# Patient Record
Sex: MALE | Race: WHITE | Employment: PART TIME | URBAN - METROPOLITAN AREA
[De-identification: names, ages, dates, MRNs, and addresses within clinical notes are randomized per-mention and may not be internally consistent; named-entity substitution may affect disease eponyms.]

---

## 2017-06-02 ENCOUNTER — TELEPHONE (OUTPATIENT)
Dept: FAMILY MEDICINE CLINIC | Age: 67
End: 2017-06-02

## 2017-06-02 DIAGNOSIS — E78.2 MIXED HYPERLIPIDEMIA: ICD-10-CM

## 2017-06-02 RX ORDER — ATORVASTATIN CALCIUM 40 MG/1
TABLET, FILM COATED ORAL
Qty: 90 TABLET | Refills: 3 | Status: SHIPPED | OUTPATIENT
Start: 2017-06-02 | End: 2017-06-23 | Stop reason: SDUPTHER

## 2017-06-02 RX ORDER — ATORVASTATIN CALCIUM 40 MG/1
TABLET, FILM COATED ORAL
Qty: 90 TABLET | Refills: 3 | Status: SHIPPED | OUTPATIENT
Start: 2017-06-02 | End: 2017-06-02 | Stop reason: SDUPTHER

## 2017-06-13 DIAGNOSIS — I10 ESSENTIAL HYPERTENSION: Primary | ICD-10-CM

## 2017-06-13 DIAGNOSIS — E78.2 MIXED HYPERLIPIDEMIA: ICD-10-CM

## 2017-06-13 DIAGNOSIS — Z12.5 PROSTATE CANCER SCREENING: ICD-10-CM

## 2017-06-16 DIAGNOSIS — I10 ESSENTIAL HYPERTENSION: ICD-10-CM

## 2017-06-16 DIAGNOSIS — E78.2 MIXED HYPERLIPIDEMIA: ICD-10-CM

## 2017-06-16 DIAGNOSIS — Z12.5 PROSTATE CANCER SCREENING: ICD-10-CM

## 2017-06-16 LAB
ALBUMIN SERPL-MCNC: 4.4 G/DL (ref 3.9–4.9)
ALP BLD-CCNC: 81 U/L (ref 35–104)
ALT SERPL-CCNC: 32 U/L (ref 0–41)
ANION GAP SERPL CALCULATED.3IONS-SCNC: 12 MEQ/L (ref 7–13)
AST SERPL-CCNC: 30 U/L (ref 0–40)
BASOPHILS ABSOLUTE: 0.1 K/UL (ref 0–0.2)
BASOPHILS RELATIVE PERCENT: 2.6 %
BILIRUB SERPL-MCNC: 1 MG/DL (ref 0–1.2)
BUN BLDV-MCNC: 12 MG/DL (ref 8–23)
CALCIUM SERPL-MCNC: 8.9 MG/DL (ref 8.6–10.2)
CHLORIDE BLD-SCNC: 105 MEQ/L (ref 98–107)
CHOLESTEROL, TOTAL: 216 MG/DL (ref 0–199)
CO2: 24 MEQ/L (ref 22–29)
CREAT SERPL-MCNC: 1.05 MG/DL (ref 0.7–1.2)
EOSINOPHILS ABSOLUTE: 0.2 K/UL (ref 0–0.7)
EOSINOPHILS RELATIVE PERCENT: 3.2 %
GFR AFRICAN AMERICAN: >60
GFR NON-AFRICAN AMERICAN: >60
GLOBULIN: 2.2 G/DL (ref 2.3–3.5)
GLUCOSE BLD-MCNC: 93 MG/DL (ref 74–109)
HCT VFR BLD CALC: 45.6 % (ref 42–52)
HDLC SERPL-MCNC: 68 MG/DL (ref 40–59)
HEMOGLOBIN: 15.2 G/DL (ref 14–18)
LDL CHOLESTEROL CALCULATED: 119 MG/DL (ref 0–129)
LYMPHOCYTES ABSOLUTE: 0.9 K/UL (ref 1–4.8)
LYMPHOCYTES RELATIVE PERCENT: 16.8 %
MCH RBC QN AUTO: 29.4 PG (ref 27–31.3)
MCHC RBC AUTO-ENTMCNC: 33.3 % (ref 33–37)
MCV RBC AUTO: 88.5 FL (ref 80–100)
MONOCYTES ABSOLUTE: 0.6 K/UL (ref 0.2–0.8)
MONOCYTES RELATIVE PERCENT: 10.8 %
NEUTROPHILS ABSOLUTE: 3.5 K/UL (ref 1.4–6.5)
NEUTROPHILS RELATIVE PERCENT: 66.6 %
PDW BLD-RTO: 13.6 % (ref 11.5–14.5)
PLATELET # BLD: 299 K/UL (ref 130–400)
POTASSIUM SERPL-SCNC: 4.3 MEQ/L (ref 3.5–5.1)
PROSTATE SPECIFIC ANTIGEN: 0.96 NG/ML (ref 0–5.4)
RBC # BLD: 5.15 M/UL (ref 4.7–6.1)
SLIDE REVIEW: ABNORMAL
SODIUM BLD-SCNC: 141 MEQ/L (ref 132–144)
TOTAL PROTEIN: 6.6 G/DL (ref 6.4–8.1)
TRIGL SERPL-MCNC: 147 MG/DL (ref 0–200)
TSH SERPL DL<=0.05 MIU/L-ACNC: 7.26 UIU/ML (ref 0.27–4.2)
WBC # BLD: 5.2 K/UL (ref 4.8–10.8)

## 2017-06-19 DIAGNOSIS — I10 ESSENTIAL HYPERTENSION: ICD-10-CM

## 2017-06-19 DIAGNOSIS — K21.9 GASTROESOPHAGEAL REFLUX DISEASE, ESOPHAGITIS PRESENCE NOT SPECIFIED: ICD-10-CM

## 2017-06-19 RX ORDER — PINDOLOL 10 MG/1
TABLET ORAL
Qty: 90 TABLET | Refills: 0 | Status: SHIPPED | OUTPATIENT
Start: 2017-06-19 | End: 2017-06-23 | Stop reason: SDUPTHER

## 2017-06-19 RX ORDER — PANTOPRAZOLE SODIUM 40 MG/1
TABLET, DELAYED RELEASE ORAL
Qty: 90 TABLET | Refills: 0 | Status: SHIPPED | OUTPATIENT
Start: 2017-06-19 | End: 2017-06-23 | Stop reason: SDUPTHER

## 2017-06-23 ENCOUNTER — OFFICE VISIT (OUTPATIENT)
Dept: FAMILY MEDICINE CLINIC | Age: 67
End: 2017-06-23

## 2017-06-23 VITALS
DIASTOLIC BLOOD PRESSURE: 86 MMHG | OXYGEN SATURATION: 98 % | HEART RATE: 68 BPM | WEIGHT: 213 LBS | RESPIRATION RATE: 12 BRPM | TEMPERATURE: 96.9 F | BODY MASS INDEX: 30.49 KG/M2 | SYSTOLIC BLOOD PRESSURE: 136 MMHG | HEIGHT: 70 IN

## 2017-06-23 DIAGNOSIS — K21.9 GASTROESOPHAGEAL REFLUX DISEASE, ESOPHAGITIS PRESENCE NOT SPECIFIED: ICD-10-CM

## 2017-06-23 DIAGNOSIS — Z00.00 WELL ADULT EXAM: Primary | ICD-10-CM

## 2017-06-23 DIAGNOSIS — M15.9 PRIMARY OSTEOARTHRITIS INVOLVING MULTIPLE JOINTS: ICD-10-CM

## 2017-06-23 DIAGNOSIS — E03.4 HYPOTHYROIDISM DUE TO ACQUIRED ATROPHY OF THYROID: ICD-10-CM

## 2017-06-23 DIAGNOSIS — I10 ESSENTIAL HYPERTENSION: ICD-10-CM

## 2017-06-23 DIAGNOSIS — E78.2 MIXED HYPERLIPIDEMIA: ICD-10-CM

## 2017-06-23 DIAGNOSIS — Z12.11 COLON CANCER SCREENING: ICD-10-CM

## 2017-06-23 PROCEDURE — 99397 PER PM REEVAL EST PAT 65+ YR: CPT | Performed by: FAMILY MEDICINE

## 2017-06-23 RX ORDER — LEVOTHYROXINE SODIUM 0.03 MG/1
25 TABLET ORAL DAILY
Qty: 30 TABLET | Refills: 3 | Status: SHIPPED | OUTPATIENT
Start: 2017-06-23 | End: 2017-09-29 | Stop reason: SDUPTHER

## 2017-06-23 RX ORDER — PANTOPRAZOLE SODIUM 40 MG/1
TABLET, DELAYED RELEASE ORAL
Qty: 90 TABLET | Refills: 3 | Status: SHIPPED | OUTPATIENT
Start: 2017-06-23 | End: 2018-07-01 | Stop reason: SDUPTHER

## 2017-06-23 RX ORDER — PINDOLOL 10 MG/1
TABLET ORAL
Qty: 90 TABLET | Refills: 3 | Status: SHIPPED | OUTPATIENT
Start: 2017-06-23 | End: 2018-07-01 | Stop reason: SDUPTHER

## 2017-06-23 RX ORDER — ATORVASTATIN CALCIUM 40 MG/1
TABLET, FILM COATED ORAL
Qty: 90 TABLET | Refills: 3 | Status: SHIPPED | OUTPATIENT
Start: 2017-06-23 | End: 2018-06-18 | Stop reason: SDUPTHER

## 2017-06-23 ASSESSMENT — PATIENT HEALTH QUESTIONNAIRE - PHQ9
1. LITTLE INTEREST OR PLEASURE IN DOING THINGS: 0
SUM OF ALL RESPONSES TO PHQ9 QUESTIONS 1 & 2: 0
SUM OF ALL RESPONSES TO PHQ QUESTIONS 1-9: 0
2. FEELING DOWN, DEPRESSED OR HOPELESS: 0

## 2017-06-23 ASSESSMENT — ENCOUNTER SYMPTOMS
EYES NEGATIVE: 1
RESPIRATORY NEGATIVE: 1
ORTHOPNEA: 0
GASTROINTESTINAL NEGATIVE: 1
SHORTNESS OF BREATH: 0
BLURRED VISION: 0
ALLERGIC/IMMUNOLOGIC NEGATIVE: 1

## 2017-08-21 ENCOUNTER — PATIENT MESSAGE (OUTPATIENT)
Dept: FAMILY MEDICINE CLINIC | Age: 67
End: 2017-08-21

## 2017-08-23 ENCOUNTER — PATIENT MESSAGE (OUTPATIENT)
Dept: FAMILY MEDICINE CLINIC | Age: 67
End: 2017-08-23

## 2017-09-23 DIAGNOSIS — E03.4 HYPOTHYROIDISM DUE TO ACQUIRED ATROPHY OF THYROID: ICD-10-CM

## 2017-09-23 DIAGNOSIS — E78.2 MIXED HYPERLIPIDEMIA: ICD-10-CM

## 2017-09-23 DIAGNOSIS — I10 ESSENTIAL HYPERTENSION: ICD-10-CM

## 2017-09-23 DIAGNOSIS — K21.9 GASTROESOPHAGEAL REFLUX DISEASE, ESOPHAGITIS PRESENCE NOT SPECIFIED: ICD-10-CM

## 2017-09-23 LAB
ALBUMIN SERPL-MCNC: 4.2 G/DL (ref 3.9–4.9)
ALP BLD-CCNC: 94 U/L (ref 35–104)
ALT SERPL-CCNC: 39 U/L (ref 0–41)
ANION GAP SERPL CALCULATED.3IONS-SCNC: 19 MEQ/L (ref 7–13)
AST SERPL-CCNC: 37 U/L (ref 0–40)
BILIRUB SERPL-MCNC: 0.9 MG/DL (ref 0–1.2)
BUN BLDV-MCNC: 15 MG/DL (ref 8–23)
CALCIUM SERPL-MCNC: 9.1 MG/DL (ref 8.6–10.2)
CHLORIDE BLD-SCNC: 105 MEQ/L (ref 98–107)
CHOLESTEROL, TOTAL: 217 MG/DL (ref 0–199)
CO2: 20 MEQ/L (ref 22–29)
CREAT SERPL-MCNC: 0.95 MG/DL (ref 0.7–1.2)
FOLATE: 9.2 NG/ML (ref 7.3–26.1)
GFR AFRICAN AMERICAN: >60
GFR NON-AFRICAN AMERICAN: >60
GLOBULIN: 2.5 G/DL (ref 2.3–3.5)
GLUCOSE BLD-MCNC: 94 MG/DL (ref 74–109)
HDLC SERPL-MCNC: 69 MG/DL (ref 40–59)
LDL CHOLESTEROL CALCULATED: 114 MG/DL (ref 0–129)
MAGNESIUM: 2.3 MG/DL (ref 1.7–2.3)
POTASSIUM SERPL-SCNC: 4.5 MEQ/L (ref 3.5–5.1)
SODIUM BLD-SCNC: 144 MEQ/L (ref 132–144)
T4 FREE: 1.04 NG/DL (ref 0.93–1.7)
TOTAL PROTEIN: 6.7 G/DL (ref 6.4–8.1)
TRIGL SERPL-MCNC: 170 MG/DL (ref 0–200)
VITAMIN B-12: 611 PG/ML (ref 211–946)

## 2017-09-27 LAB
VITAMIN D2 AND D3, TOTAL: 47 NG/ML (ref 30–80)
VITAMIN D2, 25 HYDROXY: <1 NG/ML
VITAMIN D3,25 HYDROXY: 47 NG/ML

## 2017-09-29 ENCOUNTER — OFFICE VISIT (OUTPATIENT)
Dept: FAMILY MEDICINE CLINIC | Age: 67
End: 2017-09-29

## 2017-09-29 VITALS
RESPIRATION RATE: 16 BRPM | OXYGEN SATURATION: 98 % | TEMPERATURE: 97.1 F | DIASTOLIC BLOOD PRESSURE: 78 MMHG | HEIGHT: 70 IN | WEIGHT: 210 LBS | BODY MASS INDEX: 30.06 KG/M2 | SYSTOLIC BLOOD PRESSURE: 130 MMHG | HEART RATE: 78 BPM

## 2017-09-29 DIAGNOSIS — I10 ESSENTIAL HYPERTENSION: Primary | ICD-10-CM

## 2017-09-29 DIAGNOSIS — E78.2 MIXED HYPERLIPIDEMIA: ICD-10-CM

## 2017-09-29 DIAGNOSIS — Z11.59 NEED FOR HEPATITIS C SCREENING TEST: ICD-10-CM

## 2017-09-29 DIAGNOSIS — E04.1 THYROID NODULE: ICD-10-CM

## 2017-09-29 DIAGNOSIS — E03.4 HYPOTHYROIDISM DUE TO ACQUIRED ATROPHY OF THYROID: ICD-10-CM

## 2017-09-29 DIAGNOSIS — Z12.11 SCREENING FOR COLON CANCER: ICD-10-CM

## 2017-09-29 PROCEDURE — 99214 OFFICE O/P EST MOD 30 MIN: CPT | Performed by: FAMILY MEDICINE

## 2017-09-29 PROCEDURE — G8427 DOCREV CUR MEDS BY ELIG CLIN: HCPCS | Performed by: FAMILY MEDICINE

## 2017-09-29 PROCEDURE — 4040F PNEUMOC VAC/ADMIN/RCVD: CPT | Performed by: FAMILY MEDICINE

## 2017-09-29 PROCEDURE — G8417 CALC BMI ABV UP PARAM F/U: HCPCS | Performed by: FAMILY MEDICINE

## 2017-09-29 PROCEDURE — 1036F TOBACCO NON-USER: CPT | Performed by: FAMILY MEDICINE

## 2017-09-29 PROCEDURE — 1123F ACP DISCUSS/DSCN MKR DOCD: CPT | Performed by: FAMILY MEDICINE

## 2017-09-29 PROCEDURE — 3017F COLORECTAL CA SCREEN DOC REV: CPT | Performed by: FAMILY MEDICINE

## 2017-09-29 RX ORDER — POLYETHYLENE GLYCOL 3350, SODIUM CHLORIDE, SODIUM BICARBONATE, POTASSIUM CHLORIDE 420; 11.2; 5.72; 1.48 G/4L; G/4L; G/4L; G/4L
4000 POWDER, FOR SOLUTION ORAL ONCE
Qty: 4000 ML | Refills: 0 | Status: SHIPPED | OUTPATIENT
Start: 2017-09-29 | End: 2017-09-29

## 2017-09-29 RX ORDER — LEVOTHYROXINE SODIUM 0.05 MG/1
50 TABLET ORAL DAILY
Qty: 30 TABLET | Refills: 5 | Status: SHIPPED | OUTPATIENT
Start: 2017-09-29 | End: 2017-10-30 | Stop reason: SDUPTHER

## 2017-09-29 ASSESSMENT — ENCOUNTER SYMPTOMS
GASTROINTESTINAL NEGATIVE: 1
ALLERGIC/IMMUNOLOGIC NEGATIVE: 1
RESPIRATORY NEGATIVE: 1
EYES NEGATIVE: 1

## 2017-10-13 ENCOUNTER — HOSPITAL ENCOUNTER (OUTPATIENT)
Dept: ULTRASOUND IMAGING | Age: 67
Discharge: HOME | End: 2017-10-13
Payer: COMMERCIAL

## 2017-10-13 DIAGNOSIS — E04.1 THYROID NODULE: ICD-10-CM

## 2017-10-13 DIAGNOSIS — E03.4 HYPOTHYROIDISM DUE TO ACQUIRED ATROPHY OF THYROID: ICD-10-CM

## 2017-10-13 PROCEDURE — 76536 US EXAM OF HEAD AND NECK: CPT

## 2017-10-30 DIAGNOSIS — E03.4 HYPOTHYROIDISM DUE TO ACQUIRED ATROPHY OF THYROID: ICD-10-CM

## 2017-10-30 RX ORDER — LEVOTHYROXINE SODIUM 0.05 MG/1
50 TABLET ORAL DAILY
Qty: 30 TABLET | Refills: 5 | Status: SHIPPED | OUTPATIENT
Start: 2017-10-30 | End: 2018-03-12

## 2018-03-09 DIAGNOSIS — E03.4 HYPOTHYROIDISM DUE TO ACQUIRED ATROPHY OF THYROID: ICD-10-CM

## 2018-03-12 RX ORDER — LEVOTHYROXINE SODIUM 0.05 MG/1
TABLET ORAL
Qty: 30 TABLET | Refills: 5 | Status: SHIPPED | OUTPATIENT
Start: 2018-03-12 | End: 2018-07-13 | Stop reason: SDUPTHER

## 2018-03-30 DIAGNOSIS — E03.4 HYPOTHYROIDISM DUE TO ACQUIRED ATROPHY OF THYROID: ICD-10-CM

## 2018-03-30 DIAGNOSIS — E78.2 MIXED HYPERLIPIDEMIA: ICD-10-CM

## 2018-03-30 DIAGNOSIS — I10 ESSENTIAL HYPERTENSION: ICD-10-CM

## 2018-03-30 DIAGNOSIS — Z11.59 NEED FOR HEPATITIS C SCREENING TEST: ICD-10-CM

## 2018-03-30 LAB
ALBUMIN SERPL-MCNC: 4.1 G/DL (ref 3.9–4.9)
ALP BLD-CCNC: 89 U/L (ref 35–104)
ALT SERPL-CCNC: 39 U/L (ref 0–41)
ANION GAP SERPL CALCULATED.3IONS-SCNC: 16 MEQ/L (ref 7–13)
AST SERPL-CCNC: 33 U/L (ref 0–40)
BASOPHILS ABSOLUTE: 0.1 K/UL (ref 0–0.2)
BASOPHILS RELATIVE PERCENT: 1.8 %
BILIRUB SERPL-MCNC: 0.6 MG/DL (ref 0–1.2)
BUN BLDV-MCNC: 12 MG/DL (ref 8–23)
CALCIUM SERPL-MCNC: 9 MG/DL (ref 8.6–10.2)
CHLORIDE BLD-SCNC: 101 MEQ/L (ref 98–107)
CHOLESTEROL, TOTAL: 195 MG/DL (ref 0–199)
CO2: 24 MEQ/L (ref 22–29)
CREAT SERPL-MCNC: 0.87 MG/DL (ref 0.7–1.2)
EOSINOPHILS ABSOLUTE: 0.2 K/UL (ref 0–0.7)
EOSINOPHILS RELATIVE PERCENT: 4.4 %
GFR AFRICAN AMERICAN: >60
GFR NON-AFRICAN AMERICAN: >60
GLOBULIN: 2.3 G/DL (ref 2.3–3.5)
GLUCOSE BLD-MCNC: 96 MG/DL (ref 74–109)
HCT VFR BLD CALC: 42 % (ref 42–52)
HDLC SERPL-MCNC: 60 MG/DL (ref 40–59)
HEMOGLOBIN: 14.6 G/DL (ref 14–18)
HEPATITIS C ANTIBODY INTERPRETATION: NORMAL
LDL CHOLESTEROL CALCULATED: 102 MG/DL (ref 0–129)
LYMPHOCYTES ABSOLUTE: 0.8 K/UL (ref 1–4.8)
LYMPHOCYTES RELATIVE PERCENT: 17.8 %
MCH RBC QN AUTO: 30.7 PG (ref 27–31.3)
MCHC RBC AUTO-ENTMCNC: 34.7 % (ref 33–37)
MCV RBC AUTO: 88.4 FL (ref 80–100)
MONOCYTES ABSOLUTE: 0.4 K/UL (ref 0.2–0.8)
MONOCYTES RELATIVE PERCENT: 10 %
NEUTROPHILS ABSOLUTE: 2.9 K/UL (ref 1.4–6.5)
NEUTROPHILS RELATIVE PERCENT: 66 %
PDW BLD-RTO: 13.8 % (ref 11.5–14.5)
PLATELET # BLD: 307 K/UL (ref 130–400)
POTASSIUM SERPL-SCNC: 4.1 MEQ/L (ref 3.5–5.1)
RBC # BLD: 4.75 M/UL (ref 4.7–6.1)
SODIUM BLD-SCNC: 141 MEQ/L (ref 132–144)
T4 FREE: 1.16 NG/DL (ref 0.93–1.7)
TOTAL PROTEIN: 6.4 G/DL (ref 6.4–8.1)
TRIGL SERPL-MCNC: 165 MG/DL (ref 0–200)
TSH REFLEX: 2.05 UIU/ML (ref 0.27–4.2)
WBC # BLD: 4.5 K/UL (ref 4.8–10.8)

## 2018-04-06 ENCOUNTER — OFFICE VISIT (OUTPATIENT)
Dept: FAMILY MEDICINE CLINIC | Age: 68
End: 2018-04-06
Payer: COMMERCIAL

## 2018-04-06 VITALS
HEIGHT: 70 IN | TEMPERATURE: 97.4 F | DIASTOLIC BLOOD PRESSURE: 80 MMHG | HEART RATE: 75 BPM | OXYGEN SATURATION: 98 % | WEIGHT: 219 LBS | RESPIRATION RATE: 16 BRPM | SYSTOLIC BLOOD PRESSURE: 122 MMHG | BODY MASS INDEX: 31.35 KG/M2

## 2018-04-06 DIAGNOSIS — E78.2 MIXED HYPERLIPIDEMIA: ICD-10-CM

## 2018-04-06 DIAGNOSIS — I10 ESSENTIAL HYPERTENSION: Primary | ICD-10-CM

## 2018-04-06 DIAGNOSIS — E03.4 HYPOTHYROIDISM DUE TO ACQUIRED ATROPHY OF THYROID: ICD-10-CM

## 2018-04-06 DIAGNOSIS — Z12.5 SPECIAL SCREENING EXAMINATION FOR NEOPLASM OF PROSTATE: ICD-10-CM

## 2018-04-06 DIAGNOSIS — Z12.11 SCREENING FOR COLON CANCER: ICD-10-CM

## 2018-04-06 DIAGNOSIS — E04.1 THYROID NODULE: ICD-10-CM

## 2018-04-06 PROCEDURE — 99214 OFFICE O/P EST MOD 30 MIN: CPT | Performed by: FAMILY MEDICINE

## 2018-04-06 PROCEDURE — G8427 DOCREV CUR MEDS BY ELIG CLIN: HCPCS | Performed by: FAMILY MEDICINE

## 2018-04-06 PROCEDURE — 4040F PNEUMOC VAC/ADMIN/RCVD: CPT | Performed by: FAMILY MEDICINE

## 2018-04-06 PROCEDURE — 3017F COLORECTAL CA SCREEN DOC REV: CPT | Performed by: FAMILY MEDICINE

## 2018-04-06 PROCEDURE — 1036F TOBACCO NON-USER: CPT | Performed by: FAMILY MEDICINE

## 2018-04-06 PROCEDURE — 1123F ACP DISCUSS/DSCN MKR DOCD: CPT | Performed by: FAMILY MEDICINE

## 2018-04-06 PROCEDURE — G8417 CALC BMI ABV UP PARAM F/U: HCPCS | Performed by: FAMILY MEDICINE

## 2018-04-06 RX ORDER — AMLODIPINE BESYLATE 5 MG/1
5 TABLET ORAL DAILY
COMMUNITY
End: 2018-12-24 | Stop reason: SDUPTHER

## 2018-04-06 ASSESSMENT — ENCOUNTER SYMPTOMS
GASTROINTESTINAL NEGATIVE: 1
EYES NEGATIVE: 1
ALLERGIC/IMMUNOLOGIC NEGATIVE: 1
RESPIRATORY NEGATIVE: 1

## 2018-05-06 PROBLEM — Z12.5 SPECIAL SCREENING EXAMINATION FOR NEOPLASM OF PROSTATE: Status: RESOLVED | Noted: 2018-04-06 | Resolved: 2018-05-06

## 2018-05-17 ENCOUNTER — OFFICE VISIT (OUTPATIENT)
Dept: GASTROENTEROLOGY | Age: 68
End: 2018-05-17

## 2018-05-17 VITALS
DIASTOLIC BLOOD PRESSURE: 84 MMHG | WEIGHT: 212 LBS | BODY MASS INDEX: 30.86 KG/M2 | SYSTOLIC BLOOD PRESSURE: 133 MMHG | HEART RATE: 68 BPM

## 2018-05-17 DIAGNOSIS — Z12.11 SPECIAL SCREENING FOR MALIGNANT NEOPLASMS, COLON: Primary | ICD-10-CM

## 2018-05-17 PROCEDURE — PREOPEXAM PRE-OP EXAM: Performed by: INTERNAL MEDICINE

## 2018-06-15 ENCOUNTER — ANESTHESIA (OUTPATIENT)
Dept: ENDOSCOPY | Age: 68
End: 2018-06-15
Payer: MEDICARE

## 2018-06-15 ENCOUNTER — HOSPITAL ENCOUNTER (OUTPATIENT)
Age: 68
Setting detail: OUTPATIENT SURGERY
Discharge: HOME | End: 2018-06-15
Attending: INTERNAL MEDICINE | Admitting: INTERNAL MEDICINE
Payer: MEDICARE

## 2018-06-15 ENCOUNTER — ANESTHESIA EVENT (OUTPATIENT)
Dept: ENDOSCOPY | Age: 68
End: 2018-06-15
Payer: MEDICARE

## 2018-06-15 VITALS
SYSTOLIC BLOOD PRESSURE: 128 MMHG | RESPIRATION RATE: 12 BRPM | OXYGEN SATURATION: 98 % | DIASTOLIC BLOOD PRESSURE: 79 MMHG

## 2018-06-15 VITALS
DIASTOLIC BLOOD PRESSURE: 74 MMHG | BODY MASS INDEX: 29.63 KG/M2 | RESPIRATION RATE: 18 BRPM | WEIGHT: 207 LBS | OXYGEN SATURATION: 98 % | HEART RATE: 74 BPM | SYSTOLIC BLOOD PRESSURE: 139 MMHG | HEIGHT: 70 IN | TEMPERATURE: 98.3 F

## 2018-06-15 PROCEDURE — 7100000010 HC PHASE II RECOVERY - FIRST 15 MIN: Performed by: INTERNAL MEDICINE

## 2018-06-15 PROCEDURE — 45385 COLONOSCOPY W/LESION REMOVAL: CPT | Performed by: INTERNAL MEDICINE

## 2018-06-15 PROCEDURE — 2580000003 HC RX 258: Performed by: NURSE ANESTHETIST, CERTIFIED REGISTERED

## 2018-06-15 PROCEDURE — 6360000002 HC RX W HCPCS: Performed by: NURSE ANESTHETIST, CERTIFIED REGISTERED

## 2018-06-15 PROCEDURE — 3609027000 HC COLONOSCOPY: Performed by: INTERNAL MEDICINE

## 2018-06-15 PROCEDURE — 3700000000 HC ANESTHESIA ATTENDED CARE: Performed by: INTERNAL MEDICINE

## 2018-06-15 PROCEDURE — 3700000001 HC ADD 15 MINUTES (ANESTHESIA): Performed by: INTERNAL MEDICINE

## 2018-06-15 PROCEDURE — 2500000003 HC RX 250 WO HCPCS: Performed by: NURSE ANESTHETIST, CERTIFIED REGISTERED

## 2018-06-15 RX ORDER — SODIUM CHLORIDE 0.9 % (FLUSH) 0.9 %
SYRINGE (ML) INJECTION PRN
Status: DISCONTINUED | OUTPATIENT
Start: 2018-06-15 | End: 2018-06-15 | Stop reason: SDUPTHER

## 2018-06-15 RX ORDER — SODIUM CHLORIDE 9 MG/ML
INJECTION, SOLUTION INTRAVENOUS CONTINUOUS
Status: DISCONTINUED | OUTPATIENT
Start: 2018-06-15 | End: 2018-06-15 | Stop reason: HOSPADM

## 2018-06-15 RX ORDER — PROPOFOL 10 MG/ML
INJECTION, EMULSION INTRAVENOUS PRN
Status: DISCONTINUED | OUTPATIENT
Start: 2018-06-15 | End: 2018-06-15 | Stop reason: SDUPTHER

## 2018-06-15 RX ORDER — LIDOCAINE HYDROCHLORIDE 20 MG/ML
INJECTION, SOLUTION INFILTRATION; PERINEURAL PRN
Status: DISCONTINUED | OUTPATIENT
Start: 2018-06-15 | End: 2018-06-15 | Stop reason: SDUPTHER

## 2018-06-15 RX ORDER — ONDANSETRON 2 MG/ML
4 INJECTION INTRAMUSCULAR; INTRAVENOUS
Status: DISCONTINUED | OUTPATIENT
Start: 2018-06-15 | End: 2018-06-15 | Stop reason: HOSPADM

## 2018-06-15 RX ADMIN — Medication 5 ML: at 08:45

## 2018-06-15 RX ADMIN — PROPOFOL 30 MG: 10 INJECTION, EMULSION INTRAVENOUS at 08:53

## 2018-06-15 RX ADMIN — PROPOFOL 40 MG: 10 INJECTION, EMULSION INTRAVENOUS at 08:47

## 2018-06-15 RX ADMIN — Medication 10 ML: at 08:57

## 2018-06-15 RX ADMIN — PROPOFOL 30 MG: 10 INJECTION, EMULSION INTRAVENOUS at 08:51

## 2018-06-15 RX ADMIN — PROPOFOL 40 MG: 10 INJECTION, EMULSION INTRAVENOUS at 08:45

## 2018-06-15 RX ADMIN — PROPOFOL 30 MG: 10 INJECTION, EMULSION INTRAVENOUS at 08:55

## 2018-06-15 RX ADMIN — LIDOCAINE HYDROCHLORIDE 40 MG: 20 INJECTION, SOLUTION INFILTRATION; PERINEURAL at 08:45

## 2018-06-15 RX ADMIN — PROPOFOL 40 MG: 10 INJECTION, EMULSION INTRAVENOUS at 08:49

## 2018-06-15 ASSESSMENT — PAIN - FUNCTIONAL ASSESSMENT: PAIN_FUNCTIONAL_ASSESSMENT: 0-10

## 2018-06-18 DIAGNOSIS — E78.2 MIXED HYPERLIPIDEMIA: ICD-10-CM

## 2018-06-19 RX ORDER — ATORVASTATIN CALCIUM 40 MG/1
TABLET, FILM COATED ORAL
Qty: 90 TABLET | Refills: 3 | Status: SHIPPED | OUTPATIENT
Start: 2018-06-19 | End: 2018-07-13 | Stop reason: SDUPTHER

## 2018-06-21 RX ORDER — AZITHROMYCIN 250 MG/1
TABLET, FILM COATED ORAL
Qty: 1 PACKET | Refills: 0 | OUTPATIENT
Start: 2018-06-21 | End: 2018-06-25

## 2018-06-22 ENCOUNTER — OFFICE VISIT (OUTPATIENT)
Dept: FAMILY MEDICINE CLINIC | Age: 68
End: 2018-06-22
Payer: MEDICARE

## 2018-06-22 VITALS
BODY MASS INDEX: 30.69 KG/M2 | SYSTOLIC BLOOD PRESSURE: 130 MMHG | HEIGHT: 70 IN | WEIGHT: 214.4 LBS | HEART RATE: 75 BPM | OXYGEN SATURATION: 97 % | DIASTOLIC BLOOD PRESSURE: 74 MMHG | RESPIRATION RATE: 14 BRPM | TEMPERATURE: 97.6 F

## 2018-06-22 DIAGNOSIS — L23.7 CONTACT DERMATITIS DUE TO POISON IVY: Primary | ICD-10-CM

## 2018-06-22 PROCEDURE — 4040F PNEUMOC VAC/ADMIN/RCVD: CPT | Performed by: NURSE PRACTITIONER

## 2018-06-22 PROCEDURE — 99213 OFFICE O/P EST LOW 20 MIN: CPT | Performed by: NURSE PRACTITIONER

## 2018-06-22 PROCEDURE — 1123F ACP DISCUSS/DSCN MKR DOCD: CPT | Performed by: NURSE PRACTITIONER

## 2018-06-22 PROCEDURE — 3017F COLORECTAL CA SCREEN DOC REV: CPT | Performed by: NURSE PRACTITIONER

## 2018-06-22 PROCEDURE — G8417 CALC BMI ABV UP PARAM F/U: HCPCS | Performed by: NURSE PRACTITIONER

## 2018-06-22 PROCEDURE — G8427 DOCREV CUR MEDS BY ELIG CLIN: HCPCS | Performed by: NURSE PRACTITIONER

## 2018-06-22 PROCEDURE — 1036F TOBACCO NON-USER: CPT | Performed by: NURSE PRACTITIONER

## 2018-06-22 RX ORDER — METHYLPREDNISOLONE 4 MG/1
TABLET ORAL
Qty: 1 KIT | Refills: 0 | Status: SHIPPED | OUTPATIENT
Start: 2018-06-22 | End: 2019-01-16 | Stop reason: ALTCHOICE

## 2018-06-22 ASSESSMENT — ENCOUNTER SYMPTOMS
DIARRHEA: 0
EYE PAIN: 0
VOMITING: 0
COUGH: 0
RHINORRHEA: 0
SHORTNESS OF BREATH: 0
NAIL CHANGES: 0
SORE THROAT: 0

## 2018-07-01 DIAGNOSIS — K21.9 GASTROESOPHAGEAL REFLUX DISEASE, ESOPHAGITIS PRESENCE NOT SPECIFIED: ICD-10-CM

## 2018-07-01 DIAGNOSIS — I10 ESSENTIAL HYPERTENSION: ICD-10-CM

## 2018-07-02 RX ORDER — PINDOLOL 10 MG/1
TABLET ORAL
Qty: 90 TABLET | Refills: 3 | Status: SHIPPED | OUTPATIENT
Start: 2018-07-02 | End: 2018-07-13 | Stop reason: SDUPTHER

## 2018-07-02 RX ORDER — PANTOPRAZOLE SODIUM 40 MG/1
TABLET, DELAYED RELEASE ORAL
Qty: 90 TABLET | Refills: 3 | Status: SHIPPED | OUTPATIENT
Start: 2018-07-02 | End: 2018-07-13 | Stop reason: SDUPTHER

## 2018-07-13 DIAGNOSIS — K21.9 GASTROESOPHAGEAL REFLUX DISEASE, ESOPHAGITIS PRESENCE NOT SPECIFIED: ICD-10-CM

## 2018-07-13 DIAGNOSIS — E03.4 HYPOTHYROIDISM DUE TO ACQUIRED ATROPHY OF THYROID: ICD-10-CM

## 2018-07-13 DIAGNOSIS — E78.2 MIXED HYPERLIPIDEMIA: ICD-10-CM

## 2018-07-13 DIAGNOSIS — I10 ESSENTIAL HYPERTENSION: ICD-10-CM

## 2018-07-13 RX ORDER — PANTOPRAZOLE SODIUM 40 MG/1
TABLET, DELAYED RELEASE ORAL
Qty: 90 TABLET | Refills: 3 | Status: SHIPPED | OUTPATIENT
Start: 2018-07-13 | End: 2019-01-16 | Stop reason: SDUPTHER

## 2018-07-13 RX ORDER — PINDOLOL 10 MG/1
TABLET ORAL
Qty: 90 TABLET | Refills: 3 | Status: SHIPPED | OUTPATIENT
Start: 2018-07-13 | End: 2019-01-16 | Stop reason: SDUPTHER

## 2018-07-13 RX ORDER — ATORVASTATIN CALCIUM 40 MG/1
TABLET, FILM COATED ORAL
Qty: 90 TABLET | Refills: 3 | Status: SHIPPED | OUTPATIENT
Start: 2018-07-13 | End: 2019-01-09 | Stop reason: SDUPTHER

## 2018-07-13 RX ORDER — LEVOTHYROXINE SODIUM 0.05 MG/1
TABLET ORAL
Qty: 30 TABLET | Refills: 5 | Status: SHIPPED | OUTPATIENT
Start: 2018-07-13 | End: 2019-01-09 | Stop reason: SDUPTHER

## 2018-10-15 ENCOUNTER — HOSPITAL ENCOUNTER (OUTPATIENT)
Dept: ULTRASOUND IMAGING | Age: 68
Discharge: HOME | End: 2018-10-17
Payer: MEDICARE

## 2018-10-15 DIAGNOSIS — E04.1 THYROID NODULE: ICD-10-CM

## 2018-10-15 PROCEDURE — 76536 US EXAM OF HEAD AND NECK: CPT

## 2018-12-24 RX ORDER — AMLODIPINE BESYLATE 5 MG/1
5 TABLET ORAL DAILY
Qty: 30 TABLET | Refills: 2 | Status: SHIPPED | OUTPATIENT
Start: 2018-12-24 | End: 2019-01-16 | Stop reason: SDUPTHER

## 2019-01-09 DIAGNOSIS — E78.2 MIXED HYPERLIPIDEMIA: ICD-10-CM

## 2019-01-09 DIAGNOSIS — E03.4 HYPOTHYROIDISM DUE TO ACQUIRED ATROPHY OF THYROID: ICD-10-CM

## 2019-01-09 RX ORDER — ATORVASTATIN CALCIUM 40 MG/1
TABLET, FILM COATED ORAL
Qty: 90 TABLET | Refills: 3 | Status: SHIPPED | OUTPATIENT
Start: 2019-01-09 | End: 2019-01-16 | Stop reason: SDUPTHER

## 2019-01-09 RX ORDER — LEVOTHYROXINE SODIUM 0.05 MG/1
TABLET ORAL
Qty: 30 TABLET | Refills: 5 | Status: SHIPPED | OUTPATIENT
Start: 2019-01-09 | End: 2019-01-16 | Stop reason: SDUPTHER

## 2019-01-10 DIAGNOSIS — E78.2 MIXED HYPERLIPIDEMIA: ICD-10-CM

## 2019-01-10 DIAGNOSIS — I10 ESSENTIAL HYPERTENSION: ICD-10-CM

## 2019-01-10 DIAGNOSIS — Z12.5 SPECIAL SCREENING EXAMINATION FOR NEOPLASM OF PROSTATE: ICD-10-CM

## 2019-01-10 DIAGNOSIS — E03.4 HYPOTHYROIDISM DUE TO ACQUIRED ATROPHY OF THYROID: ICD-10-CM

## 2019-01-10 LAB
ALBUMIN SERPL-MCNC: 4.1 G/DL (ref 3.9–4.9)
ALP BLD-CCNC: 120 U/L (ref 35–104)
ALT SERPL-CCNC: 60 U/L (ref 0–41)
ANION GAP SERPL CALCULATED.3IONS-SCNC: 12 MEQ/L (ref 7–13)
AST SERPL-CCNC: 39 U/L (ref 0–40)
ATYPICAL LYMPHOCYTE RELATIVE PERCENT: 1 %
BASOPHILS ABSOLUTE: 0.1 K/UL (ref 0–0.2)
BASOPHILS RELATIVE PERCENT: 1 %
BILIRUB SERPL-MCNC: 0.6 MG/DL (ref 0–1.2)
BUN BLDV-MCNC: 18 MG/DL (ref 8–23)
CALCIUM SERPL-MCNC: 9.1 MG/DL (ref 8.6–10.2)
CHLORIDE BLD-SCNC: 102 MEQ/L (ref 98–107)
CHOLESTEROL, TOTAL: 189 MG/DL (ref 0–199)
CO2: 27 MEQ/L (ref 22–29)
CREAT SERPL-MCNC: 1.17 MG/DL (ref 0.7–1.2)
EOSINOPHILS ABSOLUTE: 0.2 K/UL (ref 0–0.7)
EOSINOPHILS RELATIVE PERCENT: 3 %
GFR AFRICAN AMERICAN: >60
GFR NON-AFRICAN AMERICAN: >60
GLOBULIN: 3 G/DL (ref 2.3–3.5)
GLUCOSE BLD-MCNC: 100 MG/DL (ref 74–109)
HCT VFR BLD CALC: 43.7 % (ref 42–52)
HDLC SERPL-MCNC: 66 MG/DL (ref 40–59)
HEMOGLOBIN: 15 G/DL (ref 14–18)
LDL CHOLESTEROL CALCULATED: 95 MG/DL (ref 0–129)
LYMPHOCYTES ABSOLUTE: 1 K/UL (ref 1–4.8)
LYMPHOCYTES RELATIVE PERCENT: 19 %
MCH RBC QN AUTO: 30.5 PG (ref 27–31.3)
MCHC RBC AUTO-ENTMCNC: 34.4 % (ref 33–37)
MCV RBC AUTO: 88.7 FL (ref 80–100)
MONOCYTES ABSOLUTE: 0.6 K/UL (ref 0.2–0.8)
MONOCYTES RELATIVE PERCENT: 11.1 %
NEUTROPHILS ABSOLUTE: 3.4 K/UL (ref 1.4–6.5)
NEUTROPHILS RELATIVE PERCENT: 66 %
PDW BLD-RTO: 13.8 % (ref 11.5–14.5)
PLATELET # BLD: 311 K/UL (ref 130–400)
PLATELET SLIDE REVIEW: NORMAL
POTASSIUM SERPL-SCNC: 4.2 MEQ/L (ref 3.5–5.1)
PROSTATE SPECIFIC ANTIGEN: 3.85 NG/ML (ref 0–5.4)
RBC # BLD: 4.93 M/UL (ref 4.7–6.1)
SODIUM BLD-SCNC: 141 MEQ/L (ref 132–144)
TOTAL PROTEIN: 7.1 G/DL (ref 6.4–8.1)
TRIGL SERPL-MCNC: 140 MG/DL (ref 0–200)
TSH REFLEX: 3.48 UIU/ML (ref 0.27–4.2)
WBC # BLD: 5.2 K/UL (ref 4.8–10.8)

## 2019-01-16 ENCOUNTER — OFFICE VISIT (OUTPATIENT)
Dept: FAMILY MEDICINE CLINIC | Age: 69
End: 2019-01-16
Payer: MEDICARE

## 2019-01-16 VITALS
DIASTOLIC BLOOD PRESSURE: 84 MMHG | TEMPERATURE: 97.6 F | WEIGHT: 215 LBS | HEART RATE: 68 BPM | HEIGHT: 68 IN | OXYGEN SATURATION: 98 % | SYSTOLIC BLOOD PRESSURE: 124 MMHG | BODY MASS INDEX: 32.58 KG/M2

## 2019-01-16 DIAGNOSIS — K21.9 GASTROESOPHAGEAL REFLUX DISEASE, ESOPHAGITIS PRESENCE NOT SPECIFIED: ICD-10-CM

## 2019-01-16 DIAGNOSIS — I10 ESSENTIAL HYPERTENSION: Primary | ICD-10-CM

## 2019-01-16 DIAGNOSIS — E03.4 HYPOTHYROIDISM DUE TO ACQUIRED ATROPHY OF THYROID: ICD-10-CM

## 2019-01-16 DIAGNOSIS — L57.0 AK (ACTINIC KERATOSIS): ICD-10-CM

## 2019-01-16 DIAGNOSIS — E78.2 MIXED HYPERLIPIDEMIA: ICD-10-CM

## 2019-01-16 PROCEDURE — 1101F PT FALLS ASSESS-DOCD LE1/YR: CPT | Performed by: FAMILY MEDICINE

## 2019-01-16 PROCEDURE — 1123F ACP DISCUSS/DSCN MKR DOCD: CPT | Performed by: FAMILY MEDICINE

## 2019-01-16 PROCEDURE — 99214 OFFICE O/P EST MOD 30 MIN: CPT | Performed by: FAMILY MEDICINE

## 2019-01-16 PROCEDURE — G8427 DOCREV CUR MEDS BY ELIG CLIN: HCPCS | Performed by: FAMILY MEDICINE

## 2019-01-16 PROCEDURE — 4040F PNEUMOC VAC/ADMIN/RCVD: CPT | Performed by: FAMILY MEDICINE

## 2019-01-16 PROCEDURE — 1036F TOBACCO NON-USER: CPT | Performed by: FAMILY MEDICINE

## 2019-01-16 PROCEDURE — G8484 FLU IMMUNIZE NO ADMIN: HCPCS | Performed by: FAMILY MEDICINE

## 2019-01-16 PROCEDURE — 3017F COLORECTAL CA SCREEN DOC REV: CPT | Performed by: FAMILY MEDICINE

## 2019-01-16 PROCEDURE — G8417 CALC BMI ABV UP PARAM F/U: HCPCS | Performed by: FAMILY MEDICINE

## 2019-01-16 RX ORDER — AMLODIPINE BESYLATE 5 MG/1
5 TABLET ORAL DAILY
Qty: 90 TABLET | Refills: 3 | Status: SHIPPED | OUTPATIENT
Start: 2019-01-16

## 2019-01-16 RX ORDER — LEVOTHYROXINE SODIUM 0.05 MG/1
TABLET ORAL
Qty: 90 TABLET | Refills: 3 | Status: SHIPPED | OUTPATIENT
Start: 2019-01-16

## 2019-01-16 RX ORDER — PANTOPRAZOLE SODIUM 40 MG/1
TABLET, DELAYED RELEASE ORAL
Qty: 90 TABLET | Refills: 3 | Status: SHIPPED | OUTPATIENT
Start: 2019-01-16

## 2019-01-16 RX ORDER — ATORVASTATIN CALCIUM 40 MG/1
TABLET, FILM COATED ORAL
Qty: 90 TABLET | Refills: 3 | Status: SHIPPED | OUTPATIENT
Start: 2019-01-16

## 2019-01-16 RX ORDER — PINDOLOL 10 MG/1
TABLET ORAL
Qty: 90 TABLET | Refills: 3 | Status: SHIPPED | OUTPATIENT
Start: 2019-01-16

## 2019-01-16 ASSESSMENT — PATIENT HEALTH QUESTIONNAIRE - PHQ9
1. LITTLE INTEREST OR PLEASURE IN DOING THINGS: 0
SUM OF ALL RESPONSES TO PHQ QUESTIONS 1-9: 0
SUM OF ALL RESPONSES TO PHQ9 QUESTIONS 1 & 2: 0
DEPRESSION UNABLE TO ASSESS: FUNCTIONAL CAPACITY MOTIVATION LIMITS ACCURACY
2. FEELING DOWN, DEPRESSED OR HOPELESS: 0
SUM OF ALL RESPONSES TO PHQ QUESTIONS 1-9: 0

## 2019-01-16 ASSESSMENT — ENCOUNTER SYMPTOMS
ALLERGIC/IMMUNOLOGIC NEGATIVE: 1
EYES NEGATIVE: 1
RESPIRATORY NEGATIVE: 1
GASTROINTESTINAL NEGATIVE: 1

## 2019-02-28 ENCOUNTER — TELEPHONE (OUTPATIENT)
Dept: FAMILY MEDICINE CLINIC | Age: 69
End: 2019-02-28

## 2019-04-02 ENCOUNTER — TELEPHONE (OUTPATIENT)
Dept: ADMINISTRATIVE | Age: 69
End: 2019-04-02

## 2019-04-02 NOTE — TELEPHONE ENCOUNTER
Spoke with patient's wife. Patient's wife states that patient to follow Dr. Adriano Isaac at [facility].

## 2019-04-07 ENCOUNTER — TELEPHONE (OUTPATIENT)
Dept: FAMILY MEDICINE CLINIC | Age: 69
End: 2019-04-07

## 2023-04-12 LAB
ALANINE AMINOTRANSFERASE (SGPT) (U/L) IN SER/PLAS: 33 U/L (ref 10–52)
ALBUMIN (G/DL) IN SER/PLAS: 4.1 G/DL (ref 3.4–5)
ALKALINE PHOSPHATASE (U/L) IN SER/PLAS: 89 U/L (ref 33–136)
ANION GAP IN SER/PLAS: 10 MMOL/L (ref 10–20)
ASPARTATE AMINOTRANSFERASE (SGOT) (U/L) IN SER/PLAS: 30 U/L (ref 9–39)
BASOPHILS (10*3/UL) IN BLOOD BY AUTOMATED COUNT: 0.08 X10E9/L (ref 0–0.1)
BASOPHILS/100 LEUKOCYTES IN BLOOD BY AUTOMATED COUNT: 1.6 % (ref 0–2)
BILIRUBIN TOTAL (MG/DL) IN SER/PLAS: 1.2 MG/DL (ref 0–1.2)
CALCIUM (MG/DL) IN SER/PLAS: 9 MG/DL (ref 8.6–10.3)
CARBON DIOXIDE, TOTAL (MMOL/L) IN SER/PLAS: 28 MMOL/L (ref 21–32)
CHLORIDE (MMOL/L) IN SER/PLAS: 104 MMOL/L (ref 98–107)
CHOLESTEROL (MG/DL) IN SER/PLAS: 179 MG/DL (ref 0–199)
CHOLESTEROL IN HDL (MG/DL) IN SER/PLAS: 57.8 MG/DL
CHOLESTEROL/HDL RATIO: 3.1
CREATININE (MG/DL) IN SER/PLAS: 1 MG/DL (ref 0.5–1.3)
EOSINOPHILS (10*3/UL) IN BLOOD BY AUTOMATED COUNT: 0.21 X10E9/L (ref 0–0.4)
EOSINOPHILS/100 LEUKOCYTES IN BLOOD BY AUTOMATED COUNT: 4.3 % (ref 0–6)
ERYTHROCYTE DISTRIBUTION WIDTH (RATIO) BY AUTOMATED COUNT: 13.5 % (ref 11.5–14.5)
ERYTHROCYTE MEAN CORPUSCULAR HEMOGLOBIN CONCENTRATION (G/DL) BY AUTOMATED: 33.6 G/DL (ref 32–36)
ERYTHROCYTE MEAN CORPUSCULAR VOLUME (FL) BY AUTOMATED COUNT: 88 FL (ref 80–100)
ERYTHROCYTES (10*6/UL) IN BLOOD BY AUTOMATED COUNT: 5.01 X10E12/L (ref 4.5–5.9)
GFR MALE: 80 ML/MIN/1.73M2
GLUCOSE (MG/DL) IN SER/PLAS: 100 MG/DL (ref 74–99)
HEMATOCRIT (%) IN BLOOD BY AUTOMATED COUNT: 44 % (ref 41–52)
HEMOGLOBIN (G/DL) IN BLOOD: 14.8 G/DL (ref 13.5–17.5)
IMMATURE GRANULOCYTES/100 LEUKOCYTES IN BLOOD BY AUTOMATED COUNT: 0.2 % (ref 0–0.9)
LDL: 93 MG/DL (ref 0–99)
LEUKOCYTES (10*3/UL) IN BLOOD BY AUTOMATED COUNT: 4.9 X10E9/L (ref 4.4–11.3)
LYMPHOCYTES (10*3/UL) IN BLOOD BY AUTOMATED COUNT: 0.92 X10E9/L (ref 0.8–3)
LYMPHOCYTES/100 LEUKOCYTES IN BLOOD BY AUTOMATED COUNT: 18.6 % (ref 13–44)
MAGNESIUM (MG/DL) IN SER/PLAS: 1.98 MG/DL (ref 1.6–2.4)
MONOCYTES (10*3/UL) IN BLOOD BY AUTOMATED COUNT: 0.55 X10E9/L (ref 0.05–0.8)
MONOCYTES/100 LEUKOCYTES IN BLOOD BY AUTOMATED COUNT: 11.1 % (ref 2–10)
NEUTROPHILS (10*3/UL) IN BLOOD BY AUTOMATED COUNT: 3.17 X10E9/L (ref 1.6–5.5)
NEUTROPHILS/100 LEUKOCYTES IN BLOOD BY AUTOMATED COUNT: 64.2 % (ref 40–80)
PLATELETS (10*3/UL) IN BLOOD AUTOMATED COUNT: 276 X10E9/L (ref 150–450)
POTASSIUM (MMOL/L) IN SER/PLAS: 4.1 MMOL/L (ref 3.5–5.3)
PROTEIN TOTAL: 6.3 G/DL (ref 6.4–8.2)
SODIUM (MMOL/L) IN SER/PLAS: 138 MMOL/L (ref 136–145)
THYROTROPIN (MIU/L) IN SER/PLAS BY DETECTION LIMIT <= 0.05 MIU/L: 2.36 MIU/L (ref 0.44–3.98)
TRIGLYCERIDE (MG/DL) IN SER/PLAS: 141 MG/DL (ref 0–149)
UREA NITROGEN (MG/DL) IN SER/PLAS: 16 MG/DL (ref 6–23)
VLDL: 28 MG/DL (ref 0–40)

## 2023-04-22 DIAGNOSIS — M19.90 ARTHRITIS: Primary | ICD-10-CM

## 2023-04-24 PROBLEM — I10 HTN (HYPERTENSION): Status: ACTIVE | Noted: 2023-04-24

## 2023-04-24 PROBLEM — G47.33 OBSTRUCTIVE SLEEP APNEA OF ADULT: Status: ACTIVE | Noted: 2023-04-24

## 2023-04-24 PROBLEM — E78.5 HYPERLIPIDEMIA: Status: ACTIVE | Noted: 2023-04-24

## 2023-04-24 PROBLEM — M19.90 ARTHRITIS: Status: ACTIVE | Noted: 2023-04-24

## 2023-04-24 PROBLEM — E03.9 HYPOTHYROID: Status: ACTIVE | Noted: 2023-04-24

## 2023-04-24 PROBLEM — E66.9 OBESITY: Status: ACTIVE | Noted: 2023-04-24

## 2023-04-24 PROBLEM — K21.9 GERD (GASTROESOPHAGEAL REFLUX DISEASE): Status: ACTIVE | Noted: 2023-04-24

## 2023-04-24 PROBLEM — I25.10 MILD CORONARY ARTERY DISEASE: Status: ACTIVE | Noted: 2023-04-24

## 2023-04-24 RX ORDER — AMLODIPINE BESYLATE 5 MG/1
1 TABLET ORAL DAILY
COMMUNITY
Start: 2020-01-14 | End: 2023-04-26

## 2023-04-24 RX ORDER — OMEPRAZOLE 20 MG/1
1 CAPSULE, DELAYED RELEASE ORAL
COMMUNITY
Start: 2020-01-22 | End: 2023-12-06

## 2023-04-24 RX ORDER — ETODOLAC 400 MG/1
1 TABLET, FILM COATED ORAL 2 TIMES DAILY PRN
COMMUNITY
Start: 2019-10-26 | End: 2023-04-24 | Stop reason: SDUPTHER

## 2023-04-24 RX ORDER — ETODOLAC 400 MG/1
TABLET, FILM COATED ORAL
Qty: 180 TABLET | Refills: 0 | Status: SHIPPED | OUTPATIENT
Start: 2023-04-24 | End: 2023-08-07 | Stop reason: SDUPTHER

## 2023-04-24 RX ORDER — ATORVASTATIN CALCIUM 80 MG/1
1 TABLET, FILM COATED ORAL NIGHTLY
COMMUNITY
Start: 2021-12-29 | End: 2023-05-09

## 2023-04-24 RX ORDER — TIZANIDINE 4 MG/1
1 TABLET ORAL 3 TIMES DAILY PRN
COMMUNITY
Start: 2019-10-26 | End: 2023-07-21 | Stop reason: SDUPTHER

## 2023-04-24 RX ORDER — ACETAMINOPHEN 500 MG
1 TABLET ORAL DAILY
COMMUNITY
Start: 2021-01-20

## 2023-04-24 RX ORDER — LEVOTHYROXINE SODIUM 75 UG/1
1 TABLET ORAL DAILY
COMMUNITY
Start: 2020-01-14 | End: 2023-12-05

## 2023-04-24 RX ORDER — EZETIMIBE 10 MG/1
1 TABLET ORAL NIGHTLY
COMMUNITY
Start: 2021-07-21 | End: 2023-05-09

## 2023-04-24 RX ORDER — PINDOLOL 10 MG/1
1 TABLET ORAL DAILY
COMMUNITY
Start: 2019-02-28 | End: 2024-01-22 | Stop reason: SDUPTHER

## 2023-04-25 DIAGNOSIS — I10 PRIMARY HYPERTENSION: Primary | ICD-10-CM

## 2023-04-26 RX ORDER — AMLODIPINE BESYLATE 5 MG/1
TABLET ORAL
Qty: 90 TABLET | Refills: 3 | Status: SHIPPED | OUTPATIENT
Start: 2023-04-26 | End: 2023-12-06

## 2023-05-08 DIAGNOSIS — E78.2 MIXED HYPERLIPIDEMIA: Primary | ICD-10-CM

## 2023-05-09 RX ORDER — ATORVASTATIN CALCIUM 80 MG/1
TABLET, FILM COATED ORAL
Qty: 90 TABLET | Refills: 3 | Status: SHIPPED | OUTPATIENT
Start: 2023-05-09 | End: 2024-01-22 | Stop reason: SDUPTHER

## 2023-05-09 RX ORDER — EZETIMIBE 10 MG/1
TABLET ORAL
Qty: 90 TABLET | Refills: 3 | Status: SHIPPED | OUTPATIENT
Start: 2023-05-09 | End: 2023-07-21 | Stop reason: SDUPTHER

## 2023-07-10 ENCOUNTER — TELEPHONE (OUTPATIENT)
Dept: PRIMARY CARE | Facility: CLINIC | Age: 73
End: 2023-07-10
Payer: MEDICARE

## 2023-07-10 DIAGNOSIS — E78.2 MIXED HYPERLIPIDEMIA: ICD-10-CM

## 2023-07-10 DIAGNOSIS — E03.9 ACQUIRED HYPOTHYROIDISM: ICD-10-CM

## 2023-07-10 DIAGNOSIS — I10 PRIMARY HYPERTENSION: Primary | ICD-10-CM

## 2023-07-10 DIAGNOSIS — Z12.5 SPECIAL SCREENING EXAMINATION FOR NEOPLASM OF PROSTATE: ICD-10-CM

## 2023-07-10 NOTE — TELEPHONE ENCOUNTER
Patient's wife Sue phones the office today in regards to lab orders for the patient.     Patient went to [facility] to have labs drawn, however, there are no lab orders in the old/new system for this patient and he is scheduled to see TW on 6-21-23.     Please clarify if he is to have labs for this upcoming appointment and contact patient at [phone] and we may LM if necessary.     Thank you.

## 2023-07-10 NOTE — PROGRESS NOTES
Subjective   Patient ID: Harsha Barber is a 72 y.o. male who presents for 6 momth check up (And follow up labs ).    Hyperlipidemia  This is a chronic problem. The current episode started more than 1 year ago. The problem is controlled. Recent lipid tests were reviewed and are normal. Exacerbating diseases include hypothyroidism and obesity. Factors aggravating his hyperlipidemia include beta blockers. Pertinent negatives include no chest pain, myalgias or shortness of breath. Current antihyperlipidemic treatment includes ezetimibe and statins. The current treatment provides significant improvement of lipids. There are no compliance problems.    Hypertension  This is a chronic problem. The current episode started more than 1 year ago. The problem is unchanged. The problem is controlled. Pertinent negatives include no chest pain, headaches, neck pain, palpitations or shortness of breath. Agents associated with hypertension include thyroid hormones and NSAIDs. Risk factors for coronary artery disease include dyslipidemia, obesity and male gender. Past treatments include beta blockers and calcium channel blockers. The current treatment provides significant improvement. There are no compliance problems.  Hypertensive end-organ damage includes CAD/MI. Identifiable causes of hypertension include a thyroid problem.   Thyroid Problem  Presents for follow-up visit. Patient reports no anxiety, cold intolerance, constipation, diaphoresis, diarrhea, fatigue, heat intolerance, palpitations or tremors. The symptoms have been stable. His past medical history is significant for hyperlipidemia.        Review of Systems   Constitutional:  Negative for activity change, appetite change, chills, diaphoresis, fatigue, fever and unexpected weight change.   HENT:  Negative for congestion, ear pain, hearing loss, nosebleeds, postnasal drip, rhinorrhea, sinus pressure, sneezing, sore throat, tinnitus, trouble swallowing and voice change.   "  Eyes:  Negative for photophobia, pain, discharge, redness, itching and visual disturbance.   Respiratory:  Negative for cough, choking, chest tightness, shortness of breath and wheezing.    Cardiovascular:  Negative for chest pain, palpitations and leg swelling.   Gastrointestinal:  Negative for abdominal distention, abdominal pain, blood in stool, constipation, diarrhea, nausea and vomiting.   Endocrine: Negative for cold intolerance, heat intolerance, polydipsia and polyuria.   Genitourinary:  Negative for dysuria, flank pain, frequency, hematuria and urgency.   Musculoskeletal:  Negative for arthralgias, back pain, joint swelling, myalgias, neck pain and neck stiffness.   Skin:  Negative for rash and wound.   Allergic/Immunologic: Negative for immunocompromised state.   Neurological:  Negative for dizziness, tremors, seizures, syncope, facial asymmetry, speech difficulty, weakness, light-headedness, numbness and headaches.   Hematological:  Negative for adenopathy. Does not bruise/bleed easily.   Psychiatric/Behavioral:  Negative for agitation, behavioral problems, confusion, dysphoric mood, hallucinations, self-injury, sleep disturbance and suicidal ideas. The patient is not nervous/anxious.        Objective   /80   Pulse 76   Temp 36.5 °C (97.7 °F) (Temporal)   Resp 16   Ht 1.74 m (5' 8.5\")   Wt 96.6 kg (213 lb)   SpO2 95%   BMI 31.92 kg/m²     Physical Exam  Constitutional:       General: He is not in acute distress.     Appearance: He is not ill-appearing or diaphoretic.   HENT:      Head: Normocephalic and atraumatic.      Right Ear: External ear normal.      Left Ear: External ear normal.      Nose: Nose normal. No rhinorrhea.   Eyes:      General: Lids are normal. No scleral icterus.        Right eye: No discharge.         Left eye: No discharge.      Conjunctiva/sclera: Conjunctivae normal.   Cardiovascular:      Rate and Rhythm: Normal rate and regular rhythm.      Pulses: Normal pulses. "      Heart sounds: No murmur heard.  Pulmonary:      Effort: Pulmonary effort is normal. No respiratory distress.      Breath sounds: No decreased breath sounds, wheezing, rhonchi or rales.   Abdominal:      General: Bowel sounds are normal. There is no distension.      Palpations: Abdomen is soft. There is no mass.      Tenderness: There is no abdominal tenderness. There is no guarding or rebound.   Musculoskeletal:         General: No swelling, tenderness or deformity.      Cervical back: No rigidity or tenderness.      Right lower leg: No edema.      Left lower leg: No edema.   Lymphadenopathy:      Cervical: No cervical adenopathy.      Upper Body:      Right upper body: No supraclavicular adenopathy.      Left upper body: No supraclavicular adenopathy.   Skin:     General: Skin is warm and dry.      Coloration: Skin is not jaundiced or pale.      Findings: No erythema, lesion or rash.   Neurological:      General: No focal deficit present.      Mental Status: He is alert and oriented to person, place, and time.      Sensory: No sensory deficit.      Motor: No weakness or tremor.      Coordination: Coordination normal.      Gait: Gait normal.   Psychiatric:         Mood and Affect: Mood normal. Affect is not inappropriate.         Behavior: Behavior normal.         Assessment/Plan   Diagnoses and all orders for this visit:  Primary hypertension  -     CBC and Auto Differential; Future  -     Comprehensive Metabolic Panel; Future  -     Lipid Panel; Future  -     Magnesium; Future  -     TSH with reflex to Free T4 if abnormal; Future  -     Follow Up In [facility] - PCP - Established; Future  Mixed hyperlipidemia  -     ezetimibe (Zetia) 10 mg tablet; Take 1 tablet (10 mg) by mouth once daily at bedtime.  -     CBC and Auto Differential; Future  -     Comprehensive Metabolic Panel; Future  -     Lipid Panel; Future  -     Magnesium; Future  -     TSH with reflex to Free T4 if abnormal; Future  -      Follow Up In [facility] - PCP - Established; Future  Malignant melanoma of trunk (CMS/HCC)  -     Follow Up In [facility] - PCP - Established; Future  Acquired hypothyroidism  -     Comprehensive Metabolic Panel; Future  -     Lipid Panel; Future  -     TSH with reflex to Free T4 if abnormal; Future  -     Follow Up In [facility] - PCP - Established; Future  Arthritis  -     tiZANidine (Zanaflex) 4 mg tablet; Take 1 tablet (4 mg) by mouth 3 times a day as needed for muscle spasms.  -     Follow Up In [facility] - PCP - Established; Future  Hymenoptera allergy  -     Follow Up In [facility] - PCP - Established; Future  -     EPINEPHrine 0.3 mg/0.3 mL injection syringe; Inject 0.3 mL (0.3 mg) as directed if needed for anaphylaxis.       Patient was identified as a fall risk. Risk prevention instructions provided.

## 2023-07-13 ENCOUNTER — LAB (OUTPATIENT)
Dept: LAB | Facility: LAB | Age: 73
End: 2023-07-13
Payer: MEDICARE

## 2023-07-13 DIAGNOSIS — E78.2 MIXED HYPERLIPIDEMIA: ICD-10-CM

## 2023-07-13 DIAGNOSIS — I10 PRIMARY HYPERTENSION: ICD-10-CM

## 2023-07-13 DIAGNOSIS — E03.9 ACQUIRED HYPOTHYROIDISM: ICD-10-CM

## 2023-07-13 DIAGNOSIS — Z12.5 SPECIAL SCREENING EXAMINATION FOR NEOPLASM OF PROSTATE: ICD-10-CM

## 2023-07-13 LAB
ALANINE AMINOTRANSFERASE (SGPT) (U/L) IN SER/PLAS: 32 U/L (ref 10–52)
ALBUMIN (G/DL) IN SER/PLAS: 4.1 G/DL (ref 3.4–5)
ALKALINE PHOSPHATASE (U/L) IN SER/PLAS: 87 U/L (ref 33–136)
ANION GAP IN SER/PLAS: 11 MMOL/L (ref 10–20)
ASPARTATE AMINOTRANSFERASE (SGOT) (U/L) IN SER/PLAS: 30 U/L (ref 9–39)
BASOPHILS (10*3/UL) IN BLOOD BY AUTOMATED COUNT: 0.09 X10E9/L (ref 0–0.1)
BASOPHILS/100 LEUKOCYTES IN BLOOD BY AUTOMATED COUNT: 1.6 % (ref 0–2)
BILIRUBIN TOTAL (MG/DL) IN SER/PLAS: 0.9 MG/DL (ref 0–1.2)
CALCIUM (MG/DL) IN SER/PLAS: 8.9 MG/DL (ref 8.6–10.3)
CARBON DIOXIDE, TOTAL (MMOL/L) IN SER/PLAS: 30 MMOL/L (ref 21–32)
CHLORIDE (MMOL/L) IN SER/PLAS: 104 MMOL/L (ref 98–107)
CHOLESTEROL (MG/DL) IN SER/PLAS: 178 MG/DL (ref 0–199)
CHOLESTEROL IN HDL (MG/DL) IN SER/PLAS: 61.6 MG/DL
CHOLESTEROL/HDL RATIO: 2.9
CREATININE (MG/DL) IN SER/PLAS: 1.03 MG/DL (ref 0.5–1.3)
EOSINOPHILS (10*3/UL) IN BLOOD BY AUTOMATED COUNT: 0.24 X10E9/L (ref 0–0.4)
EOSINOPHILS/100 LEUKOCYTES IN BLOOD BY AUTOMATED COUNT: 4.3 % (ref 0–6)
ERYTHROCYTE DISTRIBUTION WIDTH (RATIO) BY AUTOMATED COUNT: 13.2 % (ref 11.5–14.5)
ERYTHROCYTE MEAN CORPUSCULAR HEMOGLOBIN CONCENTRATION (G/DL) BY AUTOMATED: 32.7 G/DL (ref 32–36)
ERYTHROCYTE MEAN CORPUSCULAR VOLUME (FL) BY AUTOMATED COUNT: 91 FL (ref 80–100)
ERYTHROCYTES (10*6/UL) IN BLOOD BY AUTOMATED COUNT: 5.05 X10E12/L (ref 4.5–5.9)
GFR MALE: 77 ML/MIN/1.73M2
GLUCOSE (MG/DL) IN SER/PLAS: 99 MG/DL (ref 74–99)
HEMATOCRIT (%) IN BLOOD BY AUTOMATED COUNT: 45.9 % (ref 41–52)
HEMOGLOBIN (G/DL) IN BLOOD: 15 G/DL (ref 13.5–17.5)
IMMATURE GRANULOCYTES/100 LEUKOCYTES IN BLOOD BY AUTOMATED COUNT: 0.2 % (ref 0–0.9)
LDL: 87 MG/DL (ref 0–99)
LEUKOCYTES (10*3/UL) IN BLOOD BY AUTOMATED COUNT: 5.6 X10E9/L (ref 4.4–11.3)
LYMPHOCYTES (10*3/UL) IN BLOOD BY AUTOMATED COUNT: 1.16 X10E9/L (ref 0.8–3)
LYMPHOCYTES/100 LEUKOCYTES IN BLOOD BY AUTOMATED COUNT: 20.6 % (ref 13–44)
MAGNESIUM (MG/DL) IN SER/PLAS: 2.01 MG/DL (ref 1.6–2.4)
MONOCYTES (10*3/UL) IN BLOOD BY AUTOMATED COUNT: 0.6 X10E9/L (ref 0.05–0.8)
MONOCYTES/100 LEUKOCYTES IN BLOOD BY AUTOMATED COUNT: 10.7 % (ref 2–10)
NEUTROPHILS (10*3/UL) IN BLOOD BY AUTOMATED COUNT: 3.53 X10E9/L (ref 1.6–5.5)
NEUTROPHILS/100 LEUKOCYTES IN BLOOD BY AUTOMATED COUNT: 62.6 % (ref 40–80)
PLATELETS (10*3/UL) IN BLOOD AUTOMATED COUNT: 307 X10E9/L (ref 150–450)
POTASSIUM (MMOL/L) IN SER/PLAS: 4.5 MMOL/L (ref 3.5–5.3)
PROSTATE SPECIFIC ANTIGEN,SCREEN: 0.9 NG/ML (ref 0–4)
PROTEIN TOTAL: 6.6 G/DL (ref 6.4–8.2)
SODIUM (MMOL/L) IN SER/PLAS: 140 MMOL/L (ref 136–145)
THYROTROPIN (MIU/L) IN SER/PLAS BY DETECTION LIMIT <= 0.05 MIU/L: 4.42 MIU/L (ref 0.44–3.98)
THYROXINE (T4) FREE (NG/DL) IN SER/PLAS: 0.82 NG/DL (ref 0.61–1.12)
TRIGLYCERIDE (MG/DL) IN SER/PLAS: 147 MG/DL (ref 0–149)
UREA NITROGEN (MG/DL) IN SER/PLAS: 18 MG/DL (ref 6–23)
VLDL: 29 MG/DL (ref 0–40)

## 2023-07-13 PROCEDURE — 84439 ASSAY OF FREE THYROXINE: CPT

## 2023-07-13 PROCEDURE — G0103 PSA SCREENING: HCPCS

## 2023-07-13 PROCEDURE — 83735 ASSAY OF MAGNESIUM: CPT

## 2023-07-13 PROCEDURE — 84443 ASSAY THYROID STIM HORMONE: CPT

## 2023-07-13 PROCEDURE — 85025 COMPLETE CBC W/AUTO DIFF WBC: CPT

## 2023-07-13 PROCEDURE — 80061 LIPID PANEL: CPT

## 2023-07-13 PROCEDURE — 36415 COLL VENOUS BLD VENIPUNCTURE: CPT

## 2023-07-13 PROCEDURE — 80053 COMPREHEN METABOLIC PANEL: CPT

## 2023-07-21 ENCOUNTER — OFFICE VISIT (OUTPATIENT)
Dept: PRIMARY CARE | Facility: CLINIC | Age: 73
End: 2023-07-21
Payer: MEDICARE

## 2023-07-21 VITALS
TEMPERATURE: 97.7 F | RESPIRATION RATE: 16 BRPM | DIASTOLIC BLOOD PRESSURE: 80 MMHG | SYSTOLIC BLOOD PRESSURE: 130 MMHG | OXYGEN SATURATION: 95 % | HEART RATE: 76 BPM | HEIGHT: 69 IN | BODY MASS INDEX: 31.55 KG/M2 | WEIGHT: 213 LBS

## 2023-07-21 DIAGNOSIS — E78.2 MIXED HYPERLIPIDEMIA: ICD-10-CM

## 2023-07-21 DIAGNOSIS — I10 PRIMARY HYPERTENSION: Primary | ICD-10-CM

## 2023-07-21 DIAGNOSIS — Z91.038 HYMENOPTERA ALLERGY: ICD-10-CM

## 2023-07-21 DIAGNOSIS — C43.59 MALIGNANT MELANOMA OF TRUNK (MULTI): ICD-10-CM

## 2023-07-21 DIAGNOSIS — E03.9 ACQUIRED HYPOTHYROIDISM: ICD-10-CM

## 2023-07-21 DIAGNOSIS — M19.90 ARTHRITIS: ICD-10-CM

## 2023-07-21 PROCEDURE — 1160F RVW MEDS BY RX/DR IN RCRD: CPT | Performed by: FAMILY MEDICINE

## 2023-07-21 PROCEDURE — 1159F MED LIST DOCD IN RCRD: CPT | Performed by: FAMILY MEDICINE

## 2023-07-21 PROCEDURE — 1036F TOBACCO NON-USER: CPT | Performed by: FAMILY MEDICINE

## 2023-07-21 PROCEDURE — 3075F SYST BP GE 130 - 139MM HG: CPT | Performed by: FAMILY MEDICINE

## 2023-07-21 PROCEDURE — 3079F DIAST BP 80-89 MM HG: CPT | Performed by: FAMILY MEDICINE

## 2023-07-21 PROCEDURE — 99214 OFFICE O/P EST MOD 30 MIN: CPT | Performed by: FAMILY MEDICINE

## 2023-07-21 RX ORDER — EPINEPHRINE 0.3 MG/.3ML
1 INJECTION SUBCUTANEOUS AS NEEDED
Qty: 2 EACH | Refills: 3 | Status: SHIPPED | OUTPATIENT
Start: 2023-07-21 | End: 2023-07-21 | Stop reason: SDUPTHER

## 2023-07-21 RX ORDER — EZETIMIBE 10 MG/1
10 TABLET ORAL NIGHTLY
Qty: 90 TABLET | Refills: 3 | Status: SHIPPED | OUTPATIENT
Start: 2023-07-21 | End: 2024-01-22 | Stop reason: SDUPTHER

## 2023-07-21 RX ORDER — TIZANIDINE 4 MG/1
4 TABLET ORAL 3 TIMES DAILY PRN
Qty: 180 TABLET | Refills: 1 | Status: SHIPPED | OUTPATIENT
Start: 2023-07-21 | End: 2024-07-20

## 2023-07-21 RX ORDER — EPINEPHRINE 0.3 MG/.3ML
1 INJECTION SUBCUTANEOUS AS NEEDED
Qty: 2 EACH | Refills: 3 | Status: SHIPPED | OUTPATIENT
Start: 2023-07-21 | End: 2024-07-20

## 2023-07-21 ASSESSMENT — ENCOUNTER SYMPTOMS
SLEEP DISTURBANCE: 0
FACIAL ASYMMETRY: 0
PHOTOPHOBIA: 0
SORE THROAT: 0
RHINORRHEA: 0
DYSURIA: 0
ABDOMINAL DISTENTION: 0
DIZZINESS: 0
PALPITATIONS: 0
FREQUENCY: 0
CHOKING: 0
HEADACHES: 0
BLOOD IN STOOL: 0
APPETITE CHANGE: 0
UNEXPECTED WEIGHT CHANGE: 0
DYSPHORIC MOOD: 0
EYE ITCHING: 0
FATIGUE: 0
NAUSEA: 0
DIAPHORESIS: 0
WHEEZING: 0
COUGH: 0
TROUBLE SWALLOWING: 0
ADENOPATHY: 0
VOICE CHANGE: 0
NECK STIFFNESS: 0
CONFUSION: 0
SEIZURES: 0
AGITATION: 0
CONSTIPATION: 0
SPEECH DIFFICULTY: 0
NUMBNESS: 0
HALLUCINATIONS: 0
NERVOUS/ANXIOUS: 0
VOMITING: 0
ARTHRALGIAS: 0
EYE PAIN: 0
SINUS PRESSURE: 0
WOUND: 0
WEAKNESS: 0
LIGHT-HEADEDNESS: 0
SHORTNESS OF BREATH: 0
FLANK PAIN: 0
EYE REDNESS: 0
HEMATURIA: 0
POLYDIPSIA: 0
DIARRHEA: 0
ACTIVITY CHANGE: 0
CHILLS: 0
JOINT SWELLING: 0
BRUISES/BLEEDS EASILY: 0
NECK PAIN: 0
CHEST TIGHTNESS: 0
ABDOMINAL PAIN: 0
TREMORS: 0
BACK PAIN: 0
EYE DISCHARGE: 0
HYPERTENSION: 1
MYALGIAS: 0
FEVER: 0

## 2023-08-07 DIAGNOSIS — M19.90 ARTHRITIS: ICD-10-CM

## 2023-08-07 RX ORDER — ETODOLAC 400 MG/1
TABLET, FILM COATED ORAL
Qty: 180 TABLET | Refills: 0 | Status: SHIPPED | OUTPATIENT
Start: 2023-08-07 | End: 2023-10-19 | Stop reason: SDUPTHER

## 2023-10-19 DIAGNOSIS — M19.90 ARTHRITIS: ICD-10-CM

## 2023-10-19 RX ORDER — ETODOLAC 400 MG/1
TABLET, FILM COATED ORAL
Qty: 90 TABLET | Refills: 0 | Status: SHIPPED | OUTPATIENT
Start: 2023-10-19 | End: 2024-01-22 | Stop reason: SDUPTHER

## 2023-10-19 NOTE — TELEPHONE ENCOUNTER
Patient's wife Sue phones the office today w/ request to renew his Etodolac (Lodine) 400mg 1 TAB BID to be sent to [facility] on [address] for a 90 days script.     Thank you.

## 2023-12-05 DIAGNOSIS — E03.9 ACQUIRED HYPOTHYROIDISM: Primary | ICD-10-CM

## 2023-12-05 RX ORDER — LEVOTHYROXINE SODIUM 75 UG/1
75 TABLET ORAL DAILY
Qty: 100 TABLET | Refills: 2 | Status: SHIPPED | OUTPATIENT
Start: 2023-12-05 | End: 2024-01-22 | Stop reason: SDUPTHER

## 2023-12-06 DIAGNOSIS — K21.9 GASTROESOPHAGEAL REFLUX DISEASE, UNSPECIFIED WHETHER ESOPHAGITIS PRESENT: Primary | ICD-10-CM

## 2023-12-06 DIAGNOSIS — I10 PRIMARY HYPERTENSION: ICD-10-CM

## 2023-12-06 RX ORDER — AMLODIPINE BESYLATE 5 MG/1
TABLET ORAL
Qty: 100 TABLET | Refills: 2 | Status: SHIPPED | OUTPATIENT
Start: 2023-12-06 | End: 2024-01-22 | Stop reason: SDUPTHER

## 2023-12-06 RX ORDER — OMEPRAZOLE 20 MG/1
20 CAPSULE, DELAYED RELEASE ORAL
Qty: 100 CAPSULE | Refills: 2 | Status: SHIPPED | OUTPATIENT
Start: 2023-12-06

## 2024-01-11 PROBLEM — G56.03 CARPAL TUNNEL SYNDROME, BILATERAL: Status: ACTIVE | Noted: 2020-10-22

## 2024-01-11 PROBLEM — F41.8 SITUATIONAL ANXIETY: Status: ACTIVE | Noted: 2024-01-11

## 2024-01-11 PROBLEM — E55.9 VITAMIN D DEFICIENCY: Status: RESOLVED | Noted: 2024-01-11 | Resolved: 2024-01-11

## 2024-01-11 PROBLEM — U07.1 SEVERE ACUTE RESPIRATORY SYNDROME CORONAVIRUS 2 (SARS-COV-2) DETECTED: Status: RESOLVED | Noted: 2024-01-11 | Resolved: 2024-01-11

## 2024-01-16 ENCOUNTER — LAB (OUTPATIENT)
Dept: LAB | Facility: LAB | Age: 74
End: 2024-01-16
Payer: MEDICARE

## 2024-01-16 DIAGNOSIS — I10 PRIMARY HYPERTENSION: ICD-10-CM

## 2024-01-16 DIAGNOSIS — E03.9 ACQUIRED HYPOTHYROIDISM: ICD-10-CM

## 2024-01-16 DIAGNOSIS — E78.2 MIXED HYPERLIPIDEMIA: ICD-10-CM

## 2024-01-16 LAB
ALBUMIN SERPL BCP-MCNC: 4.3 G/DL (ref 3.4–5)
ALP SERPL-CCNC: 85 U/L (ref 33–136)
ALT SERPL W P-5'-P-CCNC: 30 U/L (ref 10–52)
ANION GAP SERPL CALC-SCNC: 12 MMOL/L (ref 10–20)
AST SERPL W P-5'-P-CCNC: 28 U/L (ref 9–39)
BASOPHILS # BLD AUTO: 0.1 X10*3/UL (ref 0–0.1)
BASOPHILS NFR BLD AUTO: 1.8 %
BILIRUB SERPL-MCNC: 1.2 MG/DL (ref 0–1.2)
BUN SERPL-MCNC: 17 MG/DL (ref 6–23)
CALCIUM SERPL-MCNC: 9.5 MG/DL (ref 8.6–10.3)
CHLORIDE SERPL-SCNC: 104 MMOL/L (ref 98–107)
CHOLEST SERPL-MCNC: 168 MG/DL (ref 0–199)
CHOLESTEROL/HDL RATIO: 2.6
CO2 SERPL-SCNC: 30 MMOL/L (ref 21–32)
CREAT SERPL-MCNC: 1.11 MG/DL (ref 0.5–1.3)
EGFRCR SERPLBLD CKD-EPI 2021: 70 ML/MIN/1.73M*2
EOSINOPHIL # BLD AUTO: 0.19 X10*3/UL (ref 0–0.4)
EOSINOPHIL NFR BLD AUTO: 3.5 %
ERYTHROCYTE [DISTWIDTH] IN BLOOD BY AUTOMATED COUNT: 13.2 % (ref 11.5–14.5)
GLUCOSE SERPL-MCNC: 100 MG/DL (ref 74–99)
HCT VFR BLD AUTO: 46.9 % (ref 41–52)
HDLC SERPL-MCNC: 65.5 MG/DL
HGB BLD-MCNC: 15.6 G/DL (ref 13.5–17.5)
IMM GRANULOCYTES # BLD AUTO: 0.01 X10*3/UL (ref 0–0.5)
IMM GRANULOCYTES NFR BLD AUTO: 0.2 % (ref 0–0.9)
LDLC SERPL CALC-MCNC: 79 MG/DL
LYMPHOCYTES # BLD AUTO: 1.11 X10*3/UL (ref 0.8–3)
LYMPHOCYTES NFR BLD AUTO: 20.3 %
MAGNESIUM SERPL-MCNC: 1.98 MG/DL (ref 1.6–2.4)
MCH RBC QN AUTO: 29.9 PG (ref 26–34)
MCHC RBC AUTO-ENTMCNC: 33.3 G/DL (ref 32–36)
MCV RBC AUTO: 90 FL (ref 80–100)
MONOCYTES # BLD AUTO: 0.57 X10*3/UL (ref 0.05–0.8)
MONOCYTES NFR BLD AUTO: 10.4 %
NEUTROPHILS # BLD AUTO: 3.48 X10*3/UL (ref 1.6–5.5)
NEUTROPHILS NFR BLD AUTO: 63.8 %
NON HDL CHOLESTEROL: 103 MG/DL (ref 0–149)
NRBC BLD-RTO: 0 /100 WBCS (ref 0–0)
PLATELET # BLD AUTO: 325 X10*3/UL (ref 150–450)
POTASSIUM SERPL-SCNC: 4.5 MMOL/L (ref 3.5–5.3)
PROT SERPL-MCNC: 6.8 G/DL (ref 6.4–8.2)
RBC # BLD AUTO: 5.22 X10*6/UL (ref 4.5–5.9)
SODIUM SERPL-SCNC: 141 MMOL/L (ref 136–145)
TRIGL SERPL-MCNC: 116 MG/DL (ref 0–149)
TSH SERPL-ACNC: 3.62 MIU/L (ref 0.44–3.98)
VLDL: 23 MG/DL (ref 0–40)
WBC # BLD AUTO: 5.5 X10*3/UL (ref 4.4–11.3)

## 2024-01-16 PROCEDURE — 36415 COLL VENOUS BLD VENIPUNCTURE: CPT

## 2024-01-16 PROCEDURE — 85025 COMPLETE CBC W/AUTO DIFF WBC: CPT

## 2024-01-16 PROCEDURE — 83735 ASSAY OF MAGNESIUM: CPT

## 2024-01-16 PROCEDURE — 80061 LIPID PANEL: CPT

## 2024-01-16 PROCEDURE — 84443 ASSAY THYROID STIM HORMONE: CPT

## 2024-01-16 PROCEDURE — 80053 COMPREHEN METABOLIC PANEL: CPT

## 2024-01-22 ENCOUNTER — OFFICE VISIT (OUTPATIENT)
Dept: PRIMARY CARE | Facility: CLINIC | Age: 74
End: 2024-01-22
Payer: MEDICARE

## 2024-01-22 VITALS
SYSTOLIC BLOOD PRESSURE: 132 MMHG | DIASTOLIC BLOOD PRESSURE: 85 MMHG | TEMPERATURE: 97.5 F | RESPIRATION RATE: 16 BRPM | WEIGHT: 219 LBS | HEIGHT: 69 IN | HEART RATE: 82 BPM | BODY MASS INDEX: 32.44 KG/M2 | OXYGEN SATURATION: 95 %

## 2024-01-22 DIAGNOSIS — E66.09 CLASS 1 OBESITY DUE TO EXCESS CALORIES WITH SERIOUS COMORBIDITY AND BODY MASS INDEX (BMI) OF 32.0 TO 32.9 IN ADULT: ICD-10-CM

## 2024-01-22 DIAGNOSIS — Z11.59 NEED FOR HEPATITIS C SCREENING TEST: ICD-10-CM

## 2024-01-22 DIAGNOSIS — Z00.00 ROUTINE GENERAL MEDICAL EXAMINATION AT HEALTH CARE FACILITY: Primary | ICD-10-CM

## 2024-01-22 DIAGNOSIS — Z13.6 ENCOUNTER FOR ABDOMINAL AORTIC ANEURYSM (AAA) SCREENING: ICD-10-CM

## 2024-01-22 DIAGNOSIS — E78.2 MIXED HYPERLIPIDEMIA: ICD-10-CM

## 2024-01-22 DIAGNOSIS — I10 PRIMARY HYPERTENSION: ICD-10-CM

## 2024-01-22 DIAGNOSIS — C43.59 MALIGNANT MELANOMA OF TRUNK (MULTI): ICD-10-CM

## 2024-01-22 DIAGNOSIS — E03.9 ACQUIRED HYPOTHYROIDISM: ICD-10-CM

## 2024-01-22 DIAGNOSIS — M19.90 ARTHRITIS: ICD-10-CM

## 2024-01-22 PROCEDURE — 99214 OFFICE O/P EST MOD 30 MIN: CPT | Performed by: FAMILY MEDICINE

## 2024-01-22 PROCEDURE — 1160F RVW MEDS BY RX/DR IN RCRD: CPT | Performed by: FAMILY MEDICINE

## 2024-01-22 PROCEDURE — 1159F MED LIST DOCD IN RCRD: CPT | Performed by: FAMILY MEDICINE

## 2024-01-22 PROCEDURE — 1036F TOBACCO NON-USER: CPT | Performed by: FAMILY MEDICINE

## 2024-01-22 PROCEDURE — 3075F SYST BP GE 130 - 139MM HG: CPT | Performed by: FAMILY MEDICINE

## 2024-01-22 PROCEDURE — 1170F FXNL STATUS ASSESSED: CPT | Performed by: FAMILY MEDICINE

## 2024-01-22 PROCEDURE — 3008F BODY MASS INDEX DOCD: CPT | Performed by: FAMILY MEDICINE

## 2024-01-22 PROCEDURE — G0439 PPPS, SUBSEQ VISIT: HCPCS | Performed by: FAMILY MEDICINE

## 2024-01-22 PROCEDURE — 3079F DIAST BP 80-89 MM HG: CPT | Performed by: FAMILY MEDICINE

## 2024-01-22 RX ORDER — EZETIMIBE 10 MG/1
10 TABLET ORAL NIGHTLY
Qty: 90 TABLET | Refills: 3 | Status: SHIPPED | OUTPATIENT
Start: 2024-01-22

## 2024-01-22 RX ORDER — PINDOLOL 10 MG/1
10 TABLET ORAL DAILY
Qty: 90 TABLET | Refills: 3 | Status: SHIPPED | OUTPATIENT
Start: 2024-01-22 | End: 2025-01-21

## 2024-01-22 RX ORDER — AMLODIPINE BESYLATE 5 MG/1
5 TABLET ORAL DAILY
Qty: 90 TABLET | Refills: 3 | Status: SHIPPED | OUTPATIENT
Start: 2024-01-22 | End: 2025-01-21

## 2024-01-22 RX ORDER — ATORVASTATIN CALCIUM 80 MG/1
80 TABLET, FILM COATED ORAL NIGHTLY
Qty: 90 TABLET | Refills: 3 | Status: SHIPPED | OUTPATIENT
Start: 2024-01-22

## 2024-01-22 RX ORDER — LEVOTHYROXINE SODIUM 75 UG/1
75 TABLET ORAL DAILY
Qty: 100 TABLET | Refills: 1 | Status: SHIPPED | OUTPATIENT
Start: 2024-01-22 | End: 2024-05-07

## 2024-01-22 RX ORDER — ETODOLAC 400 MG/1
TABLET, FILM COATED ORAL
Qty: 90 TABLET | Refills: 3 | Status: SHIPPED | OUTPATIENT
Start: 2024-01-22

## 2024-01-22 ASSESSMENT — ENCOUNTER SYMPTOMS
OCCASIONAL FEELINGS OF UNSTEADINESS: 0
SINUS PRESSURE: 0
CHILLS: 0
NERVOUS/ANXIOUS: 0
APPETITE CHANGE: 0
ACTIVITY CHANGE: 0
FEVER: 0
DYSPHORIC MOOD: 0
TREMORS: 0
ABDOMINAL PAIN: 0
CHEST TIGHTNESS: 0
MYALGIAS: 0
EYE DISCHARGE: 0
FLANK PAIN: 0
EYE PAIN: 0
POLYDIPSIA: 0
FACIAL ASYMMETRY: 0
VOICE CHANGE: 0
SHORTNESS OF BREATH: 0
ABDOMINAL DISTENTION: 0
LIGHT-HEADEDNESS: 0
UNEXPECTED WEIGHT CHANGE: 0
SLEEP DISTURBANCE: 0
WHEEZING: 0
WEAKNESS: 0
BLOOD IN STOOL: 0
SPEECH DIFFICULTY: 0
AGITATION: 0
FATIGUE: 0
EYE REDNESS: 0
SORE THROAT: 0
HEMATURIA: 0
SEIZURES: 0
NUMBNESS: 0
PHOTOPHOBIA: 0
HALLUCINATIONS: 0
LOSS OF SENSATION IN FEET: 1
FREQUENCY: 0
COUGH: 0
BACK PAIN: 0
DYSURIA: 0
CONSTIPATION: 0
NECK STIFFNESS: 0
DIARRHEA: 0
RHINORRHEA: 0
VOMITING: 0
CHOKING: 0
NAUSEA: 0
DEPRESSION: 0
TROUBLE SWALLOWING: 0
DIZZINESS: 0
NECK PAIN: 0
BRUISES/BLEEDS EASILY: 0
ARTHRALGIAS: 1
EYE ITCHING: 0
HYPERTENSION: 1
HEADACHES: 0
WOUND: 0
ADENOPATHY: 0
DIAPHORESIS: 0
PALPITATIONS: 0
CONFUSION: 0
JOINT SWELLING: 0

## 2024-01-22 ASSESSMENT — PATIENT HEALTH QUESTIONNAIRE - PHQ9
SUM OF ALL RESPONSES TO PHQ9 QUESTIONS 1 AND 2: 0
1. LITTLE INTEREST OR PLEASURE IN DOING THINGS: NOT AT ALL
2. FEELING DOWN, DEPRESSED OR HOPELESS: NOT AT ALL

## 2024-01-22 ASSESSMENT — ACTIVITIES OF DAILY LIVING (ADL)
BATHING: INDEPENDENT
MANAGING_FINANCES: INDEPENDENT
GROCERY_SHOPPING: INDEPENDENT
DOING_HOUSEWORK: INDEPENDENT
TAKING_MEDICATION: INDEPENDENT
DRESSING: INDEPENDENT

## 2024-01-22 NOTE — PROGRESS NOTES
Subjective   Patient ID: Harsha Barber is a 73 y.o. male who presents for Medicare Annual Wellness Visit Subsequent (And review labs) and Leg Pain (Left side, started in ankle ).    Hyperlipidemia  This is a chronic problem. The current episode started more than 1 year ago. The problem is controlled. Recent lipid tests were reviewed and are normal. Exacerbating diseases include hypothyroidism and obesity. Factors aggravating his hyperlipidemia include beta blockers. Pertinent negatives include no chest pain, myalgias or shortness of breath. Current antihyperlipidemic treatment includes ezetimibe and statins. The current treatment provides significant improvement of lipids. There are no compliance problems.  Risk factors for coronary artery disease include dyslipidemia, hypertension, male sex and obesity.   Hypertension  This is a chronic problem. The current episode started more than 1 year ago. The problem is unchanged. The problem is controlled. Pertinent negatives include no chest pain, headaches, neck pain, palpitations or shortness of breath. Agents associated with hypertension include thyroid hormones and NSAIDs. Risk factors for coronary artery disease include dyslipidemia, obesity and male gender. Past treatments include beta blockers and calcium channel blockers. The current treatment provides significant improvement. There are no compliance problems.  Hypertensive end-organ damage includes CAD/MI. Identifiable causes of hypertension include a hypertension causing med and a thyroid problem.   Thyroid Problem  Presents for follow-up visit. Patient reports no anxiety, cold intolerance, constipation, diaphoresis, diarrhea, fatigue, heat intolerance, palpitations or tremors. The symptoms have been stable. His past medical history is significant for hyperlipidemia.        Review of Systems   Constitutional:  Negative for activity change, appetite change, chills, diaphoresis, fatigue, fever and unexpected weight  "change.   HENT:  Negative for congestion, ear pain, hearing loss, nosebleeds, postnasal drip, rhinorrhea, sinus pressure, sneezing, sore throat, tinnitus, trouble swallowing and voice change.    Eyes:  Negative for photophobia, pain, discharge, redness, itching and visual disturbance.   Respiratory:  Negative for cough, choking, chest tightness, shortness of breath and wheezing.    Cardiovascular:  Negative for chest pain, palpitations and leg swelling.   Gastrointestinal:  Negative for abdominal distention, abdominal pain, blood in stool, constipation, diarrhea, nausea and vomiting.   Endocrine: Negative for cold intolerance, heat intolerance, polydipsia and polyuria.   Genitourinary:  Negative for dysuria, flank pain, frequency, hematuria and urgency.   Musculoskeletal:  Positive for arthralgias. Negative for back pain, joint swelling, myalgias, neck pain and neck stiffness.   Skin:  Negative for rash and wound.   Allergic/Immunologic: Negative for immunocompromised state.   Neurological:  Negative for dizziness, tremors, seizures, syncope, facial asymmetry, speech difficulty, weakness, light-headedness, numbness and headaches.   Hematological:  Negative for adenopathy. Does not bruise/bleed easily.   Psychiatric/Behavioral:  Negative for agitation, behavioral problems, confusion, dysphoric mood, hallucinations, self-injury, sleep disturbance and suicidal ideas. The patient is not nervous/anxious.        Objective   /85 (BP Location: Right arm, Patient Position: Sitting, BP Cuff Size: Large adult)   Pulse 82   Temp 36.4 °C (97.5 °F) (Temporal)   Resp 16   Ht 1.74 m (5' 8.5\")   Wt 99.3 kg (219 lb)   SpO2 95%   BMI 32.81 kg/m²     Physical Exam  Constitutional:       General: He is not in acute distress.     Appearance: He is not ill-appearing or diaphoretic.   HENT:      Head: Normocephalic and atraumatic.      Right Ear: External ear normal.      Left Ear: External ear normal.      Nose: Nose normal. " No rhinorrhea.   Eyes:      General: Lids are normal. No scleral icterus.        Right eye: No discharge.         Left eye: No discharge.      Conjunctiva/sclera: Conjunctivae normal.   Cardiovascular:      Rate and Rhythm: Normal rate and regular rhythm.      Pulses: Normal pulses.      Heart sounds: No murmur heard.  Pulmonary:      Effort: Pulmonary effort is normal. No respiratory distress.      Breath sounds: No decreased breath sounds, wheezing, rhonchi or rales.   Abdominal:      General: Bowel sounds are normal. There is no distension.      Palpations: Abdomen is soft. There is no mass.      Tenderness: There is no abdominal tenderness. There is no guarding or rebound.   Musculoskeletal:         General: No swelling or tenderness.      Cervical back: No rigidity or tenderness.      Right lower leg: No edema.      Left lower leg: No edema.      Comments: Diffuse arthritic deformities noted   Lymphadenopathy:      Cervical: No cervical adenopathy.      Upper Body:      Right upper body: No supraclavicular adenopathy.      Left upper body: No supraclavicular adenopathy.   Skin:     General: Skin is warm and dry.      Coloration: Skin is not jaundiced or pale.      Findings: No erythema, lesion or rash.   Neurological:      General: No focal deficit present.      Mental Status: He is alert and oriented to person, place, and time.      Sensory: No sensory deficit.      Motor: No weakness or tremor.      Coordination: Coordination normal.      Gait: Gait normal.   Psychiatric:         Mood and Affect: Mood normal. Affect is not inappropriate.         Behavior: Behavior normal.         Assessment/Plan   Diagnoses and all orders for this visit:  Routine general medical examination at health care facility  -     Follow Up In [facility] - PCP - Established; Future  Mixed hyperlipidemia  -     Follow Up In [facility] - PCP - Established  -     atorvastatin (Lipitor) 80 mg tablet; Take 1 tablet (80  mg) by mouth once daily at bedtime.  -     ezetimibe (Zetia) 10 mg tablet; Take 1 tablet (10 mg) by mouth once daily at bedtime.  -     Comprehensive Metabolic Panel; Future  -     Lipid Panel; Future  -     TSH with reflex to Free T4 if abnormal; Future  -     Follow Up In [facility] - PCP - Established; Future  Primary hypertension  -     Follow Up In [facility] - PCP - Established  -     pindolol (Visken) 10 mg tablet; Take 1 tablet (10 mg) by mouth once daily.  -     amLODIPine (Norvasc) 5 mg tablet; Take 1 tablet (5 mg) by mouth once daily.  -     Albumin , Urine Random; Future  -     CBC and Auto Differential; Future  -     Comprehensive Metabolic Panel; Future  -     Lipid Panel; Future  -     Magnesium; Future  -     TSH with reflex to Free T4 if abnormal; Future  -     Follow Up In [facility] - PCP - Established; Future  Acquired hypothyroidism  -     Follow Up In [facility] - PCP - Established  -     levothyroxine (Synthroid, Levoxyl) 75 mcg tablet; Take 1 tablet (75 mcg) by mouth once daily.  -     Comprehensive Metabolic Panel; Future  -     Lipid Panel; Future  -     TSH with reflex to Free T4 if abnormal; Future  -     Follow Up In [facility] - PCP - Established; Future  Arthritis  -     Follow Up In [facility] - PCP - Established  -     etodolac (Lodine) 400 mg tablet; TAKE 1 TABLET BY MOUTH 2 TIMES A DAY AS NEEDED  -     Follow Up In [facility] - PCP - Established; Future  Encounter for abdominal aortic aneurysm (AAA) screening  -     Vascular US abdominal aorta anuerysm AAA screening; Future  -     Follow Up In [facility] - PCP - Established; Future  Need for hepatitis C screening test  -     Hepatitis C antibody; Future  -     Follow Up In [facility] - PCP - Established; Future  Malignant melanoma of trunk (CMS/HCC)  -     Follow Up In [facility] - PCP - Established; Future  Class 1 obesity  due to excess calories with serious comorbidity and body mass index (BMI) of 32.0 to 32.9 in adult       Patient was identified as a fall risk. Risk prevention instructions provided.

## 2024-01-22 NOTE — PATIENT INSTRUCTIONS
BMI was above normal measurement. Current weight: 99.3 kg (219 lb)  Weight change since last visit (-) denotes wt loss 6 lbs   Weight loss needed to achieve BMI 25: 52.5 Lbs  Weight loss needed to achieve BMI 30: 19.2 Lbs  Provided instructions on dietary changes  Provided instructions on exercise  Advised to Increase physical activity.

## 2024-04-24 ENCOUNTER — OFFICE VISIT (OUTPATIENT)
Dept: CARDIOLOGY | Facility: CLINIC | Age: 74
End: 2024-04-24
Payer: MEDICARE

## 2024-04-24 VITALS
DIASTOLIC BLOOD PRESSURE: 76 MMHG | HEIGHT: 68 IN | SYSTOLIC BLOOD PRESSURE: 124 MMHG | BODY MASS INDEX: 32.8 KG/M2 | HEART RATE: 70 BPM | WEIGHT: 216.4 LBS

## 2024-04-24 DIAGNOSIS — E78.00 PURE HYPERCHOLESTEROLEMIA: ICD-10-CM

## 2024-04-24 DIAGNOSIS — I25.118 CORONARY ARTERY DISEASE OF NATIVE ARTERY OF NATIVE HEART WITH STABLE ANGINA PECTORIS (CMS-HCC): ICD-10-CM

## 2024-04-24 DIAGNOSIS — R07.9 CHEST PAIN, UNSPECIFIED TYPE: Primary | ICD-10-CM

## 2024-04-24 DIAGNOSIS — I10 PRIMARY HYPERTENSION: ICD-10-CM

## 2024-04-24 PROCEDURE — 1159F MED LIST DOCD IN RCRD: CPT | Performed by: INTERNAL MEDICINE

## 2024-04-24 PROCEDURE — 1160F RVW MEDS BY RX/DR IN RCRD: CPT | Performed by: INTERNAL MEDICINE

## 2024-04-24 PROCEDURE — 1036F TOBACCO NON-USER: CPT | Performed by: INTERNAL MEDICINE

## 2024-04-24 PROCEDURE — 3008F BODY MASS INDEX DOCD: CPT | Performed by: INTERNAL MEDICINE

## 2024-04-24 PROCEDURE — 3078F DIAST BP <80 MM HG: CPT | Performed by: INTERNAL MEDICINE

## 2024-04-24 PROCEDURE — 99214 OFFICE O/P EST MOD 30 MIN: CPT | Performed by: INTERNAL MEDICINE

## 2024-04-24 PROCEDURE — 1123F ACP DISCUSS/DSCN MKR DOCD: CPT | Performed by: INTERNAL MEDICINE

## 2024-04-24 PROCEDURE — 3074F SYST BP LT 130 MM HG: CPT | Performed by: INTERNAL MEDICINE

## 2024-04-24 RX ORDER — ATENOLOL 100 MG/1
TABLET ORAL
Qty: 1 TABLET | Refills: 0 | Status: SHIPPED | OUTPATIENT
Start: 2024-04-24

## 2024-04-24 RX ORDER — NITROGLYCERIN 0.4 MG/1
0.4 TABLET SUBLINGUAL EVERY 5 MIN PRN
Qty: 25 TABLET | Refills: 5 | Status: SHIPPED | OUTPATIENT
Start: 2024-04-24 | End: 2025-04-24

## 2024-04-24 NOTE — PATIENT INSTRUCTIONS
NEW:  NITROGLYCERIN 0.4 MG 1 TABLET SUBLINGUAL IF NEEDED FOR CHEST PAIN.  MAY REPEAT EVERY 5 MIN. X3.  CALL 911 IF PAIN PERSISTS OR 3RD DOSE NEEDED.      Plase bring all medicines, vitamins, and herbal supplements with you in original bottles to every appointment!    Prescriptions will not be filled unless you are compliant with your follow up appointments or have a follow up appointment scheduled as per instruction of your physician. Refills should be requested at the time of your visit.     DR FRYE IS SCHEDULING A CORONARY CT ANGIOGRAPHY TEST WHICH WILL BE SCHEDULED IN [place] AT THE [facility]    YOU NEED TO TAKE ATENOLOL 100 MG 1 TABLET AT HOME MORNING OF TESTING    YOU NEED NON FASTING BLOOD WORK PRIOR TO YOUR TEST.  ONCE YOU HAVE A TESTING DATE HAVE LABS DONE ABOUT A WEEK BEFORE THE TEST

## 2024-04-24 NOTE — PROGRESS NOTES
Patient:  Harsha Barber  YOB: 1950  MRN: [id number]       Impression/Plan:     Diagnoses and all orders for this visit:  Chest pain, unspecified type  Coronary artery disease involving native coronary artery of native heart with stable angina pectoris  -     CT angio coronary art with heartflow if score >30%; Future  -     His symptoms are not very suggestive of angina.  He has not had recent stress testing.  He has not had prior events.  He cannot tolerate aspirin as has true allergy.  And that the episodes are fairly infrequent would use as needed nitroglycerin for now.  Will obtain coronary CT angiography.  He has no adverse reaction to contrast.  No function is stable.  -     Basic Metabolic Panel; Future  -     atenolol (Tenormin) 100 mg tablet; 1 TABLET AT HOME THE MORNING OF YOUR CORONARY CT ANGIOGRAPHY  -     nitroglycerin (Nitrostat) 0.4 mg SL tablet; Place 1 tablet (0.4 mg) under the tongue every 5 minutes if needed for chest pain. May repeat dose every 5 minutes for up to 3 doses total.    Pure hypercholesterolemia        -      Quite well-controlled tolerating medications well    Primary hypertension        -       Controlled      Chief Complaint/Active Symptoms:       Harsha Barber is a 73 y.o. male who presents with coronary disease by virtue of coronary calcium score of 186 prior to 2020.  Trivial coronary irregularities at coronary angiography 2006 he has hypertension, hyperlipidemia, severe aspirin allergy, sleep apnea, hypothyroidism and GERD.    Last seen by Dr. Guanakito Franco April 2023 at which time he was asymptomatic.    He is under a great deal of stress his wife has now been in the [facility] for over a month after back surgery complicated by infection such that the wound is still open.  He says sometimes when he gets particularly anxious he gets anterior precordial tightness going down his left arm last just for a few minutes at a time sometimes a  bit longer.  He also notes if he goes up and down stairs he may or may not have a similar sensation it is not consistent.  Sometimes can last over 30 minutes.  Rarely he may feel slightly short of breath.  He has had no dizziness, lightheadedness or syncope.  He had no recent hospitalizations or emergency room visits.    He has not had prior history of myocardial infarction, stroke or symptoms of peripheral vascular disease            Component      Latest Ref Rng 1/16/2024   CHOLESTEROL      0 - 199 mg/dL 168    HDL CHOLESTEROL      mg/dL 65.5    Cholesterol/HDL Ratio 2.6    LDL Calculated      <=99 mg/dL 79    VLDL      0 - 40 mg/dL 23    TRIGLYCERIDES      0 - 149 mg/dL 116    Non HDL Cholesterol      0 - 149 mg/dL 103          Review of Systems: Unremarkable except as noted above    Meds     Current Outpatient Medications   Medication Instructions    amLODIPine (NORVASC) 5 mg, oral, Daily    atorvastatin (LIPITOR) 80 mg, oral, Nightly    cholecalciferol (Vitamin D-3) 50 mcg (2,000 unit) capsule 1 capsule, oral, Daily    EPINEPHrine (EPIPEN) 0.3 mg, injection, As needed    etodolac (Lodine) 400 mg tablet TAKE 1 TABLET BY MOUTH 2 TIMES A DAY AS NEEDED    ezetimibe (ZETIA) 10 mg, oral, Nightly    levothyroxine (SYNTHROID, LEVOXYL) 75 mcg, oral, Daily    omeprazole (PRILOSEC) 20 mg, oral, Daily before breakfast    pindolol (VISKEN) 10 mg, oral, Daily    tiZANidine (ZANAFLEX) 4 mg, oral, 3 times daily PRN        Allergies     Allergies   Allergen Reactions    Aspirin Shortness of breath and Swelling    Bee Venom Protein (Honey Bee) Anaphylaxis         Annotated Problems     Specialty Problems          Cardiology Problems    HTN (hypertension)    Hyperlipidemia    Mild coronary artery disease        Problem List     Patient Active Problem List    Diagnosis Date Noted    Situational anxiety 01/11/2024    Malignant melanoma of trunk (Multi) 07/21/2023    Hymenoptera allergy 07/21/2023    GERD (gastroesophageal reflux  "disease) 04/24/2023    HTN (hypertension) 04/24/2023    Hyperlipidemia 04/24/2023    Acquired hypothyroidism 04/24/2023    Mild coronary artery disease 04/24/2023    Obstructive sleep apnea of adult 04/24/2023    Obesity 04/24/2023    Arthritis 04/24/2023    Carpal tunnel syndrome, bilateral 10/22/2020       Objective:     Vitals:    04/24/24 1034   BP: 124/76   BP Location: Left arm   Patient Position: Sitting   Pulse: 70   Weight: 98.2 kg (216 lb 6.4 oz)   Height: 1.727 m (5' 8\")      Wt Readings from Last 4 Encounters:   04/24/24 98.2 kg (216 lb 6.4 oz)   01/22/24 99.3 kg (219 lb)   07/21/23 96.6 kg (213 lb)   04/21/23 96.2 kg (212 lb)           LAB:     Lab Results   Component Value Date    WBC 5.5 01/16/2024    HGB 15.6 01/16/2024    HCT 46.9 01/16/2024     01/16/2024    CHOL 168 01/16/2024    TRIG 116 01/16/2024    HDL 65.5 01/16/2024    ALT 30 01/16/2024    AST 28 01/16/2024     01/16/2024    K 4.5 01/16/2024     01/16/2024    CREATININE 1.11 01/16/2024    BUN 17 01/16/2024    CO2 30 01/16/2024    TSH 3.62 01/16/2024    PSA 0.77 07/14/2022       Diagnostic Studies:     No results found.      Radiology:     No orders to display       Physical Exam     General Appearance: alert and oriented to person, place and time, in no acute distress  Cardiovascular: normal rate, regular rhythm, normal S1 and S2, no murmurs, rubs, clicks, or gallops,  no JVD  Pulmonary/Chest: clear to auscultation bilaterally- no wheezes, rales or rhonchi, normal air movement, no respiratory distress  Abdomen: soft, non-tender, non-distended, normal bowel sounds, no masses   Extremities: no cyanosis, clubbing or edema  Skin: warm and dry, no rash or erythema  Eyes: EOMI  Neck: supple and non-tender without mass, no thyromegaly   Neurological: alert, oriented, normal speech, no focal findings or movement disorder noted  Vascular: Pulses 2+ no bruits appreciated            Scribe Attestation  By signing my name below, I, " Briana Muñoz CMA   , Scribe   attest that this documentation has been prepared under the direction and in the presence of Aden Sequeira MD.

## 2024-05-06 ENCOUNTER — LAB (OUTPATIENT)
Dept: LAB | Facility: LAB | Age: 74
End: 2024-05-06
Payer: MEDICARE

## 2024-05-06 DIAGNOSIS — I25.118 CORONARY ARTERY DISEASE OF NATIVE ARTERY OF NATIVE HEART WITH STABLE ANGINA PECTORIS (CMS-HCC): ICD-10-CM

## 2024-05-06 DIAGNOSIS — R07.9 CHEST PAIN, UNSPECIFIED TYPE: ICD-10-CM

## 2024-05-06 LAB
ANION GAP SERPL CALC-SCNC: 11 MMOL/L (ref 10–20)
BUN SERPL-MCNC: 13 MG/DL (ref 6–23)
CALCIUM SERPL-MCNC: 9.2 MG/DL (ref 8.6–10.3)
CHLORIDE SERPL-SCNC: 105 MMOL/L (ref 98–107)
CO2 SERPL-SCNC: 28 MMOL/L (ref 21–32)
CREAT SERPL-MCNC: 1.1 MG/DL (ref 0.5–1.3)
EGFRCR SERPLBLD CKD-EPI 2021: 71 ML/MIN/1.73M*2
GLUCOSE SERPL-MCNC: 104 MG/DL (ref 74–99)
POTASSIUM SERPL-SCNC: 5.1 MMOL/L (ref 3.5–5.3)
SODIUM SERPL-SCNC: 139 MMOL/L (ref 136–145)

## 2024-05-06 PROCEDURE — 36415 COLL VENOUS BLD VENIPUNCTURE: CPT

## 2024-05-06 PROCEDURE — 80048 BASIC METABOLIC PNL TOTAL CA: CPT

## 2024-05-07 DIAGNOSIS — E03.9 ACQUIRED HYPOTHYROIDISM: ICD-10-CM

## 2024-05-07 RX ORDER — LEVOTHYROXINE SODIUM 75 UG/1
TABLET ORAL
Qty: 30 TABLET | Refills: 0 | Status: SHIPPED | OUTPATIENT
Start: 2024-05-07 | End: 2024-05-28

## 2024-05-10 ENCOUNTER — HOSPITAL ENCOUNTER (OUTPATIENT)
Dept: RADIOLOGY | Facility: CLINIC | Age: 74
Discharge: HOME | End: 2024-05-10
Payer: MEDICARE

## 2024-05-10 VITALS — OXYGEN SATURATION: 100 % | DIASTOLIC BLOOD PRESSURE: 80 MMHG | SYSTOLIC BLOOD PRESSURE: 119 MMHG | HEART RATE: 79 BPM

## 2024-05-10 DIAGNOSIS — I25.118 CORONARY ARTERY DISEASE OF NATIVE ARTERY OF NATIVE HEART WITH STABLE ANGINA PECTORIS (CMS-HCC): ICD-10-CM

## 2024-05-10 DIAGNOSIS — R07.9 CHEST PAIN, UNSPECIFIED TYPE: ICD-10-CM

## 2024-05-10 DIAGNOSIS — R93.1 ABNORMAL FINDINGS ON DIAGNOSTIC IMAGING OF HEART AND CORONARY CIRCULATION: ICD-10-CM

## 2024-05-10 PROCEDURE — 2500000005 HC RX 250 GENERAL PHARMACY W/O HCPCS: Performed by: INTERNAL MEDICINE

## 2024-05-10 PROCEDURE — 2500000001 HC RX 250 WO HCPCS SELF ADMINISTERED DRUGS (ALT 637 FOR MEDICARE OP): Performed by: INTERNAL MEDICINE

## 2024-05-10 PROCEDURE — 75580 N-INVAS EST C FFR SW ALY CTA: CPT

## 2024-05-10 PROCEDURE — 2550000001 HC RX 255 CONTRASTS: Performed by: INTERNAL MEDICINE

## 2024-05-10 PROCEDURE — 75574 CT ANGIO HRT W/3D IMAGE: CPT

## 2024-05-10 RX ORDER — ATENOLOL 100 MG/1
100 TABLET ORAL ONCE
Status: COMPLETED | OUTPATIENT
Start: 2024-05-10 | End: 2024-05-10

## 2024-05-10 RX ORDER — METOPROLOL TARTRATE 1 MG/ML
10 INJECTION, SOLUTION INTRAVENOUS ONCE
Status: COMPLETED | OUTPATIENT
Start: 2024-05-10 | End: 2024-05-10

## 2024-05-10 RX ORDER — NITROGLYCERIN 400 UG/1
2 SPRAY ORAL ONCE
Status: COMPLETED | OUTPATIENT
Start: 2024-05-10 | End: 2024-05-10

## 2024-05-10 RX ADMIN — IOHEXOL 80 ML: 350 INJECTION, SOLUTION INTRAVENOUS at 09:55

## 2024-05-10 RX ADMIN — METOPROLOL TARTRATE 10 MG: 5 INJECTION INTRAVENOUS at 09:35

## 2024-05-10 RX ADMIN — ATENOLOL 100 MG: 25 TABLET ORAL at 08:59

## 2024-05-10 RX ADMIN — METOPROLOL TARTRATE 10 MG: 5 INJECTION INTRAVENOUS at 09:30

## 2024-05-10 RX ADMIN — NITROGLYCERIN 2 SPRAY: 400 SPRAY ORAL at 09:40

## 2024-05-27 DIAGNOSIS — E03.9 ACQUIRED HYPOTHYROIDISM: ICD-10-CM

## 2024-05-28 ENCOUNTER — OFFICE VISIT (OUTPATIENT)
Dept: CARDIOLOGY | Facility: CLINIC | Age: 74
End: 2024-05-28
Payer: MEDICARE

## 2024-05-28 VITALS
SYSTOLIC BLOOD PRESSURE: 128 MMHG | BODY MASS INDEX: 30.51 KG/M2 | DIASTOLIC BLOOD PRESSURE: 82 MMHG | WEIGHT: 206 LBS | HEART RATE: 82 BPM | HEIGHT: 69 IN

## 2024-05-28 DIAGNOSIS — I10 PRIMARY HYPERTENSION: ICD-10-CM

## 2024-05-28 DIAGNOSIS — I25.10 CORONARY ARTERY DISEASE INVOLVING NATIVE CORONARY ARTERY OF NATIVE HEART WITHOUT ANGINA PECTORIS: ICD-10-CM

## 2024-05-28 DIAGNOSIS — E78.00 PURE HYPERCHOLESTEROLEMIA: ICD-10-CM

## 2024-05-28 PROBLEM — R07.9 CHEST PAIN: Status: RESOLVED | Noted: 2024-04-24 | Resolved: 2024-05-28

## 2024-05-28 PROBLEM — I25.118 CORONARY ARTERY DISEASE OF NATIVE ARTERY OF NATIVE HEART WITH STABLE ANGINA PECTORIS (CMS-HCC): Status: ACTIVE | Noted: 2023-04-24

## 2024-05-28 PROCEDURE — 3074F SYST BP LT 130 MM HG: CPT | Performed by: INTERNAL MEDICINE

## 2024-05-28 PROCEDURE — 1159F MED LIST DOCD IN RCRD: CPT | Performed by: INTERNAL MEDICINE

## 2024-05-28 PROCEDURE — 3008F BODY MASS INDEX DOCD: CPT | Performed by: INTERNAL MEDICINE

## 2024-05-28 PROCEDURE — 1123F ACP DISCUSS/DSCN MKR DOCD: CPT | Performed by: INTERNAL MEDICINE

## 2024-05-28 PROCEDURE — 99214 OFFICE O/P EST MOD 30 MIN: CPT | Performed by: INTERNAL MEDICINE

## 2024-05-28 PROCEDURE — 1160F RVW MEDS BY RX/DR IN RCRD: CPT | Performed by: INTERNAL MEDICINE

## 2024-05-28 PROCEDURE — 3079F DIAST BP 80-89 MM HG: CPT | Performed by: INTERNAL MEDICINE

## 2024-05-28 PROCEDURE — 1036F TOBACCO NON-USER: CPT | Performed by: INTERNAL MEDICINE

## 2024-05-28 RX ORDER — LEVOTHYROXINE SODIUM 75 UG/1
75 TABLET ORAL DAILY
Qty: 30 TABLET | Refills: 11 | Status: SHIPPED | OUTPATIENT
Start: 2024-05-28

## 2024-05-28 NOTE — PROGRESS NOTES
Patient:  Harsha Barber  YOB: 1950  MRN: [id number]       Impression/Plan:     Diagnoses and all orders for this visit:  Coronary artery disease involving native coronary artery of native heart without angina pectoris  -     Normal functional capacity without symptoms  -     Coronary CT scan shows no stenosis though there is plaque  -     Given there is no obstructive disease and he has an allergy to aspirin we will not use antiplatelet therapy.  He has no evidence of vascular disease of significance in any distribution.  I think aggressive control blood pressure and lipids is of primary focus at this point as opposed to adding another agent.  -     Her chest pain symptoms are noncardiac    Pure hypercholesterolemia         -     Tolerating statin and Zetia well.  Goal is to keep LDL near 70 laboratory testing is ordered by primary care to be done in the next few months    Primary hypertension          -     Well-controlled    Chief Complaint/Active Symptoms:       Harsha Barber is a 73 y.o. male who presents with coronary disease by virtue of coronary calcium score of 186 prior to 2020.  Trivial coronary irregularities at coronary angiography 2006 he has hypertension, hyperlipidemia, severe aspirin allergy, sleep apnea, hypothyroidism and GERD.       I had last seen him 4/24/2024 he was considerably stressed because his wife had been in [facility] for over a month with back surgery and postop infection.  He was describing some chest discomfort when he would get anxious.  In light of this coronary CT angiogram was obtained.  It is noteworthy he has a true aspirin allergy.    BMP 5/6/2024 normal except glucose 104    Coronary CT angiogram 5/11/2024  IMPRESSION:  1.  Mild diffuse coronary artery disease without significant stenosis.  2.  Coronary calcium score 146, 44th percentile for age, gender race  in asymptomatic patients.    Also showed evidence of moderate hiatal hernia    He denies  angina, dyspnea, palpitation, edema, lightheadedness or syncope.  He has had no symptoms of claudication or neurologic deterioration.  There have been no hospitalizations or emergency room visits since last office visit.    He has no symptoms to suggest angina.  He still is under significant stress as his wife remains in the nursing home after having extensive surgery and multiple returns to the hospital for infection.  She does seem to be doing a little bit better.  He tries to stay as active as he can thus far no cardiovascular symptoms.  He reports perhaps a brief sharp chest pain with stress but does not last very long and is not suggestive of angina at this point in time.               Review of Systems: Unremarkable except as noted above    Meds     Current Outpatient Medications   Medication Instructions    amLODIPine (NORVASC) 5 mg, oral, Daily    atenolol (Tenormin) 100 mg tablet 1 TABLET AT HOME THE MORNING OF YOUR CORONARY CT ANGIOGRAPHY    atorvastatin (LIPITOR) 80 mg, oral, Nightly    cholecalciferol (Vitamin D-3) 50 mcg (2,000 unit) capsule 1 capsule, oral, Daily    EPINEPHrine (EPIPEN) 0.3 mg, injection, As needed    etodolac (Lodine) 400 mg tablet TAKE 1 TABLET BY MOUTH 2 TIMES A DAY AS NEEDED    ezetimibe (ZETIA) 10 mg, oral, Nightly    levothyroxine (Synthroid, Levoxyl) 75 mcg tablet P.o. 1 daily    nitroglycerin (NITROSTAT) 0.4 mg, sublingual, Every 5 min PRN, May repeat dose every 5 minutes for up to 3 doses total.    omeprazole (PRILOSEC) 20 mg, oral, Daily before breakfast    pindolol (VISKEN) 10 mg, oral, Daily    tiZANidine (ZANAFLEX) 4 mg, oral, 3 times daily PRN        Allergies     Allergies   Allergen Reactions    Aspirin Shortness of breath and Swelling    Bee Venom Protein (Honey Bee) Anaphylaxis         Annotated Problems     Specialty Problems          Cardiology Problems    Coronary artery disease of native artery of native heart with stable angina pectoris (CMS-[facility])     Coronary CT  "angiogram 5/11/2024  IMPRESSION:  1.  Mild diffuse coronary artery disease without significant stenosis.  2.  Coronary calcium score 146, 44th percentile for age, gender race  in asymptomatic patients.    Also showed evidence of moderate hiatal hernia         HTN (hypertension)    Hyperlipidemia        Problem List     Patient Active Problem List    Diagnosis Date Noted    Situational anxiety 01/11/2024    Malignant melanoma of trunk (Multi) 07/21/2023    Hymenoptera allergy 07/21/2023    GERD (gastroesophageal reflux disease) 04/24/2023    HTN (hypertension) 04/24/2023    Hyperlipidemia 04/24/2023    Acquired hypothyroidism 04/24/2023    Coronary artery disease of native artery of native heart with stable angina pectoris (CMS-[facility]) 04/24/2023    Obstructive sleep apnea of adult 04/24/2023    Obesity 04/24/2023    Arthritis 04/24/2023    Carpal tunnel syndrome, bilateral 10/22/2020       Objective:     Vitals:    05/28/24 0844   BP: 128/82   BP Location: Right arm   Patient Position: Sitting   Pulse: 82   Weight: 93.4 kg (206 lb)   Height: 1.753 m (5' 9\")      Wt Readings from Last 4 Encounters:   05/28/24 93.4 kg (206 lb)   04/24/24 98.2 kg (216 lb 6.4 oz)   01/22/24 99.3 kg (219 lb)   07/21/23 96.6 kg (213 lb)           LAB:     Lab Results   Component Value Date    WBC 5.5 01/16/2024    HGB 15.6 01/16/2024    HCT 46.9 01/16/2024     01/16/2024    CHOL 168 01/16/2024    TRIG 116 01/16/2024    HDL 65.5 01/16/2024    ALT 30 01/16/2024    AST 28 01/16/2024     05/06/2024    K 5.1 05/06/2024     05/06/2024    CREATININE 1.10 05/06/2024    BUN 13 05/06/2024    CO2 28 05/06/2024    TSH 3.62 01/16/2024    PSA 0.77 07/14/2022       Diagnostic Studies:     CT angio coronary art with heartflow if score >30%    Addendum Date: 5/11/2024    Interpreted By:  Brian Robertson, ADDENDUM: Technical: The following is to serve as an over-read for a contrast-enhanced cardiac CT, to evaluate the extra vascular " structures.   Contiguous axial CT sections are performed from level the nikhil to the upper abdomen following the bolus administration of 75 cc of intravenous Isovue 350.       Findings: The visualized portions of both lungs are clear.   There is no sign of pathologic lymph node enlargement. There is no pericardial or pleural effusion.   There is a moderate to large size hiatal hernia. Images through the upper abdomen are otherwise unremarkable.   The visualized osseous and soft tissue structures of the chest wall are intact.       Impression: Moderate size hiatal hernia.   The extra vascular structures are otherwise unremarkable.   Signed by: Brian Robertson 5/11/2024 11:49 PM   -------- ORIGINAL REPORT -------- Dictation workstation:   [id number]    Result Date: 5/11/2024  Interpreted By:  Brian Sage, STUDY: CT ANGIO CORONARY ART WITH HEARTFLOW IF SCORE >30%;  5/10/2024 9:54 am   INDICATION: Signs/Symptoms:CHEST PAIN, CAD.   COMPARISON: None.   ACCESSION NUMBER(S): [id number]   ORDERING CLINICIAN: TREVIN FRYE   TECHNIQUE: Using multi-detector CT technology, Chava 64-slice scanner, axial, sequential imaging with retrospective gating was performed of the chest following the intravenous administration of contrast material. A low-osmolar contrast agent was used 75 ml of Omnipaque 350. Using prospective ECG gating, CT scan of the coronary arteries was performed without intravenous contrast. Coronary calcium scoring was performed according to the method of Agatston.   The patient was premedicated with atenolol and 0.4 mg sublingual nitroglycerin per protocol for heart rate control and coronary dilation, respectively.   For optimization of anatomic evaluation, multiplanar reconstruction, maximum intensity projections, and advanced 3-D off-line postprocessing were performed on a dedicated stand-alone workstation under the direct supervision of the interpreting physician.   CT Dose-Length Product (DLP):   766.4 mGy/cm CT Dose Reduction Employed: Yes iterative reconstruction     FINDINGS: The left main is normal sized vessel that  bifurcates into the LAD and circumflex.   There is no significant atherosclerotic change or stenotic disease.   LEFT ANTERIOR DESCENDING ARTERY: The LAD is a normal size vessel that  wraps around the apex. Proximal/mid calcified plaque without significant stenosis. Distal noncalcified plaque without significant stenosis. LAD gives rise to 2 acute diagonal branches. D1: Proximal mixed plaque without significant stenosis. D2: Proximal noncalcified plaque without significant stenosis.   LEFT CIRCUMFLEX ARTERY: The LCfx is a normal size vessel, which is  non-dominant. Proximal mixed plaque without significant stenosis. LCfx gives rise to 1 obtuse marginal branches. OM1 proximal noncalcified plaque without significant stenosis.   RIGHT CORONARY ARTERY: The RCA is a normal size vessel, which is  dominant .   It gives rise to a conus branch, cheryl branch, and 1 acute marginal branches.  In its distal segment it bifurcates into the PDA and PV branch.   Proximal calcified plaque without significant stenosis. Mid/distal noncalcified plaque without significant stenosis. Motion artifact present.   Right PDA proximal/mid noncalcified plaque without significant stenosis. Right PLV proximal noncalcified plaque without significant stenosis.   Coronary artery calcium score 146, 44th percentile for age, gender, and race in asymptomatic patients.   CARDIAC CHAMBERS: The cardiac chambers demonstrate normal atrioventricular and ventriculoarterial concordance, and systemic and pulmonary venous return.   LEFT VENTRICLE: Normal size End diastolic volume 105 ml, 48 ml/m2   LEFT VENTRICLE MASS: 152 gm, 70 gm/m2   RIGHT VENTRICLE: Normal size End diastolic volume 120 ml, 55 ml/m2   LEFT ATRIUM: Normal size End systolic volume 70 ml, 30 ml/m2   RIGHT ATRIUM: Normal size End systolic volume 57 ml, 96 ml/m2   INTERATRIAL  SEPTUM: Intact.   AORTIC VALVE: The aortic valve is  trileaflet in morphology. No calcifications.   MITRAL VALVE: No thickening/calcification.   THORACIC AORTA: The visualized thoracic aorta is normal in course, caliber, and contour.   There is no acute aortic pathology, such as dissection, intramural hematoma, or contained rupture.   The aortic arch is not included on this examination.   PERICARDIUM: There is no pericardial effusion of thickening.       1.  Mild diffuse coronary artery disease without significant stenosis. 2.  Coronary calcium score 146, 44th percentile for age, gender race in asymptomatic patients.   Reading Cardiologist: Dr. Brian Sage, Date:  5/11/2024  11:57 am   Signed by: Brian Sage 5/11/2024 12:00 PM Dictation workstation:   [id number]    CT heartflow analysis    Result Date: 5/10/2024  These images are not reportable by radiology and will not be interpreted by  Radiologists.        Radiology:     No orders to display       Physical Exam     General Appearance: alert and oriented to person, place and time, in no acute distress  Cardiovascular: normal rate, regular rhythm, normal S1 and S2, no murmurs, rubs, clicks, or gallops,  no JVD  Pulmonary/Chest: clear to auscultation bilaterally- no wheezes, rales or rhonchi, normal air movement, no respiratory distress  Abdomen: soft, non-tender, non-distended, normal bowel sounds, no masses   Extremities: no cyanosis, clubbing or edema  Skin: warm and dry, no rash or erythema  Eyes: EOMI  Neck: supple and non-tender without mass, no thyromegaly   Neurological: alert, oriented, normal speech, no focal findings or movement disorder noted  Vascular: Distal pulses 2+.  No bruits appreciated              Scribe Attestation  By signing my name below, I, Aden Sequeira MD , Scribe   attest that this documentation has been prepared under the direction and in the presence of Aden Sequeira MD.

## 2024-07-08 ENCOUNTER — LAB (OUTPATIENT)
Dept: LAB | Facility: LAB | Age: 74
End: 2024-07-08
Payer: MEDICARE

## 2024-07-08 DIAGNOSIS — I10 PRIMARY HYPERTENSION: ICD-10-CM

## 2024-07-08 DIAGNOSIS — E03.9 ACQUIRED HYPOTHYROIDISM: ICD-10-CM

## 2024-07-08 DIAGNOSIS — Z11.59 NEED FOR HEPATITIS C SCREENING TEST: ICD-10-CM

## 2024-07-08 DIAGNOSIS — E78.2 MIXED HYPERLIPIDEMIA: ICD-10-CM

## 2024-07-08 LAB
ALBUMIN SERPL BCP-MCNC: 4.2 G/DL (ref 3.4–5)
ALP SERPL-CCNC: 76 U/L (ref 33–136)
ALT SERPL W P-5'-P-CCNC: 30 U/L (ref 10–52)
ANION GAP SERPL CALC-SCNC: 12 MMOL/L (ref 10–20)
AST SERPL W P-5'-P-CCNC: 30 U/L (ref 9–39)
BASOPHILS # BLD AUTO: 0.08 X10*3/UL (ref 0–0.1)
BASOPHILS NFR BLD AUTO: 1.8 %
BILIRUB SERPL-MCNC: 1.3 MG/DL (ref 0–1.2)
BUN SERPL-MCNC: 13 MG/DL (ref 6–23)
CALCIUM SERPL-MCNC: 8.9 MG/DL (ref 8.6–10.3)
CHLORIDE SERPL-SCNC: 106 MMOL/L (ref 98–107)
CHOLEST SERPL-MCNC: 158 MG/DL (ref 0–199)
CHOLESTEROL/HDL RATIO: 2.6
CO2 SERPL-SCNC: 28 MMOL/L (ref 21–32)
CREAT SERPL-MCNC: 1.01 MG/DL (ref 0.5–1.3)
CREAT UR-MCNC: 281.9 MG/DL (ref 20–370)
EGFRCR SERPLBLD CKD-EPI 2021: 79 ML/MIN/1.73M*2
EOSINOPHIL # BLD AUTO: 0.18 X10*3/UL (ref 0–0.4)
EOSINOPHIL NFR BLD AUTO: 4 %
ERYTHROCYTE [DISTWIDTH] IN BLOOD BY AUTOMATED COUNT: 13.4 % (ref 11.5–14.5)
GLUCOSE SERPL-MCNC: 108 MG/DL (ref 74–99)
HCT VFR BLD AUTO: 45.3 % (ref 41–52)
HCV AB SER QL: NONREACTIVE
HDLC SERPL-MCNC: 60.5 MG/DL
HGB BLD-MCNC: 14.8 G/DL (ref 13.5–17.5)
IMM GRANULOCYTES # BLD AUTO: 0.01 X10*3/UL (ref 0–0.5)
IMM GRANULOCYTES NFR BLD AUTO: 0.2 % (ref 0–0.9)
LDLC SERPL CALC-MCNC: 74 MG/DL
LYMPHOCYTES # BLD AUTO: 0.69 X10*3/UL (ref 0.8–3)
LYMPHOCYTES NFR BLD AUTO: 15.3 %
MAGNESIUM SERPL-MCNC: 2 MG/DL (ref 1.6–2.4)
MCH RBC QN AUTO: 29.8 PG (ref 26–34)
MCHC RBC AUTO-ENTMCNC: 32.7 G/DL (ref 32–36)
MCV RBC AUTO: 91 FL (ref 80–100)
MICROALBUMIN UR-MCNC: 12.6 MG/L
MICROALBUMIN/CREAT UR: 4.5 UG/MG CREAT
MONOCYTES # BLD AUTO: 0.39 X10*3/UL (ref 0.05–0.8)
MONOCYTES NFR BLD AUTO: 8.6 %
NEUTROPHILS # BLD AUTO: 3.17 X10*3/UL (ref 1.6–5.5)
NEUTROPHILS NFR BLD AUTO: 70.1 %
NON HDL CHOLESTEROL: 98 MG/DL (ref 0–149)
NRBC BLD-RTO: 0 /100 WBCS (ref 0–0)
PLATELET # BLD AUTO: 290 X10*3/UL (ref 150–450)
POTASSIUM SERPL-SCNC: 4.4 MMOL/L (ref 3.5–5.3)
PROT SERPL-MCNC: 6.5 G/DL (ref 6.4–8.2)
RBC # BLD AUTO: 4.96 X10*6/UL (ref 4.5–5.9)
SODIUM SERPL-SCNC: 142 MMOL/L (ref 136–145)
TRIGL SERPL-MCNC: 120 MG/DL (ref 0–149)
TSH SERPL-ACNC: 2.17 MIU/L (ref 0.44–3.98)
VLDL: 24 MG/DL (ref 0–40)
WBC # BLD AUTO: 4.5 X10*3/UL (ref 4.4–11.3)

## 2024-07-08 PROCEDURE — 83735 ASSAY OF MAGNESIUM: CPT

## 2024-07-08 PROCEDURE — 80061 LIPID PANEL: CPT

## 2024-07-08 PROCEDURE — 86803 HEPATITIS C AB TEST: CPT

## 2024-07-08 PROCEDURE — 80053 COMPREHEN METABOLIC PANEL: CPT

## 2024-07-08 PROCEDURE — 82043 UR ALBUMIN QUANTITATIVE: CPT

## 2024-07-08 PROCEDURE — 84443 ASSAY THYROID STIM HORMONE: CPT

## 2024-07-08 PROCEDURE — 85025 COMPLETE CBC W/AUTO DIFF WBC: CPT

## 2024-07-08 PROCEDURE — 36415 COLL VENOUS BLD VENIPUNCTURE: CPT

## 2024-07-08 PROCEDURE — 82570 ASSAY OF URINE CREATININE: CPT

## 2024-07-10 ENCOUNTER — HOSPITAL ENCOUNTER (OUTPATIENT)
Dept: RADIOLOGY | Facility: HOSPITAL | Age: 74
Discharge: HOME | End: 2024-07-10
Payer: MEDICARE

## 2024-07-10 DIAGNOSIS — Z13.6 ENCOUNTER FOR ABDOMINAL AORTIC ANEURYSM (AAA) SCREENING: ICD-10-CM

## 2024-07-10 PROCEDURE — 76706 US ABDL AORTA SCREEN AAA: CPT | Performed by: RADIOLOGY

## 2024-07-10 PROCEDURE — 76706 US ABDL AORTA SCREEN AAA: CPT

## 2024-07-22 ENCOUNTER — APPOINTMENT (OUTPATIENT)
Dept: PRIMARY CARE | Facility: CLINIC | Age: 74
End: 2024-07-22
Payer: MEDICARE

## 2024-07-22 VITALS
HEART RATE: 67 BPM | BODY MASS INDEX: 29.33 KG/M2 | WEIGHT: 198 LBS | DIASTOLIC BLOOD PRESSURE: 83 MMHG | HEIGHT: 69 IN | RESPIRATION RATE: 16 BRPM | SYSTOLIC BLOOD PRESSURE: 133 MMHG | TEMPERATURE: 97.8 F | OXYGEN SATURATION: 94 %

## 2024-07-22 DIAGNOSIS — E03.9 ACQUIRED HYPOTHYROIDISM: ICD-10-CM

## 2024-07-22 DIAGNOSIS — M19.90 ARTHRITIS: ICD-10-CM

## 2024-07-22 DIAGNOSIS — K21.9 GASTROESOPHAGEAL REFLUX DISEASE, UNSPECIFIED WHETHER ESOPHAGITIS PRESENT: ICD-10-CM

## 2024-07-22 DIAGNOSIS — I10 PRIMARY HYPERTENSION: Primary | ICD-10-CM

## 2024-07-22 DIAGNOSIS — H69.91 ETD (EUSTACHIAN TUBE DYSFUNCTION), RIGHT: ICD-10-CM

## 2024-07-22 DIAGNOSIS — R73.02 GLUCOSE INTOLERANCE (IMPAIRED GLUCOSE TOLERANCE): ICD-10-CM

## 2024-07-22 DIAGNOSIS — E78.2 MIXED HYPERLIPIDEMIA: ICD-10-CM

## 2024-07-22 DIAGNOSIS — M15.9 PRIMARY OSTEOARTHRITIS INVOLVING MULTIPLE JOINTS: ICD-10-CM

## 2024-07-22 PROBLEM — M15.0 PRIMARY OSTEOARTHRITIS INVOLVING MULTIPLE JOINTS: Status: ACTIVE | Noted: 2023-04-24

## 2024-07-22 PROCEDURE — 3079F DIAST BP 80-89 MM HG: CPT | Performed by: FAMILY MEDICINE

## 2024-07-22 PROCEDURE — 3075F SYST BP GE 130 - 139MM HG: CPT | Performed by: FAMILY MEDICINE

## 2024-07-22 PROCEDURE — 99214 OFFICE O/P EST MOD 30 MIN: CPT | Performed by: FAMILY MEDICINE

## 2024-07-22 PROCEDURE — 1159F MED LIST DOCD IN RCRD: CPT | Performed by: FAMILY MEDICINE

## 2024-07-22 PROCEDURE — 1036F TOBACCO NON-USER: CPT | Performed by: FAMILY MEDICINE

## 2024-07-22 PROCEDURE — 1123F ACP DISCUSS/DSCN MKR DOCD: CPT | Performed by: FAMILY MEDICINE

## 2024-07-22 PROCEDURE — 3008F BODY MASS INDEX DOCD: CPT | Performed by: FAMILY MEDICINE

## 2024-07-22 RX ORDER — PINDOLOL 10 MG/1
10 TABLET ORAL DAILY
Qty: 90 TABLET | Refills: 3 | Status: SHIPPED | OUTPATIENT
Start: 2024-07-22 | End: 2025-07-22

## 2024-07-22 RX ORDER — ATORVASTATIN CALCIUM 80 MG/1
80 TABLET, FILM COATED ORAL NIGHTLY
Qty: 90 TABLET | Refills: 3 | Status: SHIPPED | OUTPATIENT
Start: 2024-07-22

## 2024-07-22 RX ORDER — FLUTICASONE PROPIONATE 50 MCG
2 SPRAY, SUSPENSION (ML) NASAL DAILY
Qty: 16 G | Refills: 11 | Status: SHIPPED | OUTPATIENT
Start: 2024-07-22 | End: 2025-07-22

## 2024-07-22 RX ORDER — LEVOTHYROXINE SODIUM 75 UG/1
75 TABLET ORAL DAILY
Qty: 90 TABLET | Refills: 3 | Status: SHIPPED | OUTPATIENT
Start: 2024-07-22 | End: 2025-07-17

## 2024-07-22 RX ORDER — EZETIMIBE 10 MG/1
10 TABLET ORAL NIGHTLY
Qty: 90 TABLET | Refills: 3 | Status: SHIPPED | OUTPATIENT
Start: 2024-07-22

## 2024-07-22 RX ORDER — OMEPRAZOLE 20 MG/1
20 CAPSULE, DELAYED RELEASE ORAL
Qty: 90 CAPSULE | Refills: 3 | Status: SHIPPED | OUTPATIENT
Start: 2024-07-22 | End: 2025-07-22

## 2024-07-22 RX ORDER — AMLODIPINE BESYLATE 5 MG/1
5 TABLET ORAL DAILY
Qty: 90 TABLET | Refills: 3 | Status: SHIPPED | OUTPATIENT
Start: 2024-07-22 | End: 2025-07-22

## 2024-07-22 RX ORDER — ETODOLAC 400 MG/1
TABLET, FILM COATED ORAL
Qty: 90 TABLET | Refills: 3 | Status: SHIPPED | OUTPATIENT
Start: 2024-07-22

## 2024-07-22 ASSESSMENT — ENCOUNTER SYMPTOMS
NECK STIFFNESS: 0
HYPERTENSION: 1
HEADACHES: 0
UNEXPECTED WEIGHT CHANGE: 0
BRUISES/BLEEDS EASILY: 0
APPETITE CHANGE: 0
NECK PAIN: 0
TROUBLE SWALLOWING: 0
VOICE CHANGE: 0
SHORTNESS OF BREATH: 0
ACTIVITY CHANGE: 0
DYSURIA: 0
FREQUENCY: 0
SORE THROAT: 0
RHINORRHEA: 0
FACIAL ASYMMETRY: 0
PHOTOPHOBIA: 0
WHEEZING: 0
CHILLS: 0
SINUS PRESSURE: 0
VOMITING: 0
FEVER: 0
SEIZURES: 0
DYSPHORIC MOOD: 0
BLOOD IN STOOL: 0
NUMBNESS: 0
SLEEP DISTURBANCE: 0
WEAKNESS: 0
BACK PAIN: 0
NERVOUS/ANXIOUS: 0
SPEECH DIFFICULTY: 0
ARTHRALGIAS: 1
CHOKING: 0
EYE ITCHING: 0
AGITATION: 0
ADENOPATHY: 0
DIAPHORESIS: 0
FATIGUE: 0
DIZZINESS: 0
EYE REDNESS: 0
POLYDIPSIA: 0
DIARRHEA: 0
HALLUCINATIONS: 0
PALPITATIONS: 0
ABDOMINAL DISTENTION: 0
CONSTIPATION: 0
COUGH: 0
EYE PAIN: 0
CHEST TIGHTNESS: 0
EYE DISCHARGE: 0
MYALGIAS: 0
ABDOMINAL PAIN: 0
HEMATURIA: 0
CONFUSION: 0
JOINT SWELLING: 0
LIGHT-HEADEDNESS: 0
WOUND: 0
FLANK PAIN: 0
NAUSEA: 0
TREMORS: 0

## 2024-07-22 ASSESSMENT — PATIENT HEALTH QUESTIONNAIRE - PHQ9
1. LITTLE INTEREST OR PLEASURE IN DOING THINGS: NOT AT ALL
SUM OF ALL RESPONSES TO PHQ9 QUESTIONS 1 AND 2: 0
2. FEELING DOWN, DEPRESSED OR HOPELESS: NOT AT ALL

## 2024-07-22 NOTE — PROGRESS NOTES
Subjective   Patient ID: Harsha Barber is a 73 y.o. male who presents for 6 month check up (And review labs ) and Ear Fullness (right).    Hypertension  This is a chronic problem. The current episode started more than 1 year ago. The problem is unchanged. The problem is controlled. Pertinent negatives include no chest pain, headaches, neck pain, palpitations or shortness of breath. Agents associated with hypertension include thyroid hormones and NSAIDs. Risk factors for coronary artery disease include dyslipidemia and male gender. Past treatments include beta blockers and calcium channel blockers. The current treatment provides significant improvement. There are no compliance problems.  Identifiable causes of hypertension include a hypertension causing med and a thyroid problem. There is no history of chronic renal disease.   Hyperlipidemia  This is a chronic problem. The current episode started more than 1 year ago. The problem is controlled. Recent lipid tests were reviewed and are normal. Exacerbating diseases include hypothyroidism. He has no history of chronic renal disease, diabetes, liver disease, obesity or nephrotic syndrome. Factors aggravating his hyperlipidemia include beta blockers. Pertinent negatives include no chest pain, myalgias or shortness of breath. Current antihyperlipidemic treatment includes statins and ezetimibe. The current treatment provides significant improvement of lipids. There are no compliance problems.  Risk factors for coronary artery disease include dyslipidemia, hypertension and male sex.   Thyroid Problem  Presents for follow-up visit. Patient reports no anxiety, cold intolerance, constipation, diaphoresis, diarrhea, fatigue, heat intolerance, palpitations or tremors. The symptoms have been stable. His past medical history is significant for hyperlipidemia. There is no history of diabetes.        Review of Systems   Constitutional:  Negative for activity change, appetite change,  "chills, diaphoresis, fatigue, fever and unexpected weight change.   HENT:  Positive for congestion. Negative for ear pain, hearing loss, nosebleeds, postnasal drip, rhinorrhea, sinus pressure, sneezing, sore throat, tinnitus, trouble swallowing and voice change.         Right ear fullness   Eyes:  Negative for photophobia, pain, discharge, redness, itching and visual disturbance.   Respiratory:  Negative for cough, choking, chest tightness, shortness of breath and wheezing.    Cardiovascular:  Negative for chest pain, palpitations and leg swelling.   Gastrointestinal:  Negative for abdominal distention, abdominal pain, blood in stool, constipation, diarrhea, nausea and vomiting.   Endocrine: Negative for cold intolerance, heat intolerance, polydipsia and polyuria.   Genitourinary:  Negative for dysuria, flank pain, frequency, hematuria and urgency.   Musculoskeletal:  Positive for arthralgias. Negative for back pain, joint swelling, myalgias, neck pain and neck stiffness.   Skin:  Negative for rash and wound.   Allergic/Immunologic: Negative for immunocompromised state.   Neurological:  Negative for dizziness, tremors, seizures, syncope, facial asymmetry, speech difficulty, weakness, light-headedness, numbness and headaches.   Hematological:  Negative for adenopathy. Does not bruise/bleed easily.   Psychiatric/Behavioral:  Negative for agitation, behavioral problems, confusion, dysphoric mood, hallucinations, self-injury, sleep disturbance and suicidal ideas. The patient is not nervous/anxious.        Objective   /83 (BP Location: Right arm, Patient Position: Sitting, BP Cuff Size: Large adult)   Pulse 67   Temp 36.6 °C (97.8 °F) (Temporal)   Resp 16   Ht 1.753 m (5' 9\")   Wt 89.8 kg (198 lb)   SpO2 94%   BMI 29.24 kg/m²     Physical Exam  Constitutional:       General: He is not in acute distress.     Appearance: He is not ill-appearing or diaphoretic.   HENT:      Head: Normocephalic and atraumatic. "      Right Ear: External ear normal. A middle ear effusion is present. Tympanic membrane is not injected, scarred, perforated, erythematous, retracted or bulging.      Left Ear: External ear normal.  No middle ear effusion. Tympanic membrane is not injected, scarred, perforated, erythematous, retracted or bulging.      Nose: Nose normal. No rhinorrhea.   Eyes:      General: Lids are normal. No scleral icterus.        Right eye: No discharge.         Left eye: No discharge.      Conjunctiva/sclera: Conjunctivae normal.   Cardiovascular:      Rate and Rhythm: Normal rate and regular rhythm.      Pulses: Normal pulses.      Heart sounds: No murmur heard.  Pulmonary:      Effort: Pulmonary effort is normal. No respiratory distress.      Breath sounds: No decreased breath sounds, wheezing, rhonchi or rales.   Abdominal:      General: Bowel sounds are normal. There is no distension.      Palpations: Abdomen is soft. There is no mass.      Tenderness: There is no abdominal tenderness. There is no guarding or rebound.   Musculoskeletal:         General: No swelling or tenderness.      Cervical back: No rigidity or tenderness.      Right lower leg: No edema.      Left lower leg: No edema.      Comments: Diffuse arthritic deformities noted   Lymphadenopathy:      Cervical: No cervical adenopathy.      Upper Body:      Right upper body: No supraclavicular adenopathy.      Left upper body: No supraclavicular adenopathy.   Skin:     General: Skin is warm and dry.      Coloration: Skin is not jaundiced or pale.      Findings: No erythema, lesion or rash.   Neurological:      General: No focal deficit present.      Mental Status: He is alert and oriented to person, place, and time.      Sensory: No sensory deficit.      Motor: No weakness or tremor.      Coordination: Coordination normal.      Gait: Gait normal.   Psychiatric:         Mood and Affect: Mood normal. Affect is not inappropriate.         Behavior: Behavior normal.          Assessment/Plan   Diagnoses and all orders for this visit:  Primary hypertension  -     Follow Up In [facility] Established  -     CBC and Auto Differential; Future  -     Comprehensive Metabolic Panel; Future  -     Lipid Panel; Future  -     Magnesium; Future  -     TSH with reflex to Free T4 if abnormal; Future  -     Albumin-Creatinine Ratio, Urine Random; Future  -     amLODIPine (Norvasc) 5 mg tablet; Take 1 tablet (5 mg) by mouth once daily.  -     pindolol (Visken) 10 mg tablet; Take 1 tablet (10 mg) by mouth once daily.  -     Follow Up In [facility] - Established; Future  Mixed hyperlipidemia  -     Follow Up In [facility] Established  -     Comprehensive Metabolic Panel; Future  -     Lipid Panel; Future  -     TSH with reflex to Free T4 if abnormal; Future  -     atorvastatin (Lipitor) 80 mg tablet; Take 1 tablet (80 mg) by mouth once daily at bedtime.  -     ezetimibe (Zetia) 10 mg tablet; Take 1 tablet (10 mg) by mouth once daily at bedtime.  -     Follow Up In [facility] Established; Future  Acquired hypothyroidism  -     Follow Up In [facility] Established  -     levothyroxine (Synthroid, Levoxyl) 75 mcg tablet; Take 1 tablet (75 mcg) by mouth once daily.  -     Comprehensive Metabolic Panel; Future  -     Lipid Panel; Future  -     TSH with reflex to Free T4 if abnormal; Future  -     Follow Up In [facility] Established; Future  Primary osteoarthritis involving multiple joints  -     Follow Up In [facility] Established; Future  Gastroesophageal reflux disease, unspecified whether esophagitis present  -     omeprazole (PriLOSEC) 20 mg DR capsule; Take 1 capsule (20 mg) by mouth once daily in the morning. Take before meals.  -     Follow Up In [facility] Established; Future  Arthritis  -     etodolac (Lodine) 400 mg tablet; TAKE 1 TABLET BY MOUTH  2 TIMES A DAY AS NEEDED  -     Follow Up In [facility] - Established; Future  Glucose intolerance (impaired glucose tolerance)  -     CBC and Auto Differential; Future  -     Comprehensive Metabolic Panel; Future  -     Lipid Panel; Future  -     Magnesium; Future  -     TSH with reflex to Free T4 if abnormal; Future  -     Albumin-Creatinine Ratio, Urine Random; Future  -     Hemoglobin A1C; Future  -     Follow Up In [facility] - PCP - Established; Future  ETD (Eustachian tube dysfunction), right  -     fluticasone (Flonase) 50 mcg/actuation nasal spray; Administer 2 sprays into each nostril once daily. Shake gently. Before first use, prime pump. After use, clean tip and replace cap.

## 2024-09-23 ENCOUNTER — OFFICE VISIT (OUTPATIENT)
Dept: URGENT CARE | Age: 74
End: 2024-09-23
Payer: MEDICARE

## 2024-09-23 ENCOUNTER — ANCILLARY PROCEDURE (OUTPATIENT)
Dept: URGENT CARE | Age: 74
End: 2024-09-23
Payer: MEDICARE

## 2024-09-23 VITALS
BODY MASS INDEX: 28.58 KG/M2 | RESPIRATION RATE: 20 BRPM | HEART RATE: 69 BPM | TEMPERATURE: 98.4 F | WEIGHT: 193 LBS | OXYGEN SATURATION: 94 % | SYSTOLIC BLOOD PRESSURE: 152 MMHG | HEIGHT: 69 IN | DIASTOLIC BLOOD PRESSURE: 81 MMHG

## 2024-09-23 DIAGNOSIS — M79.604 DIFFUSE PAIN IN RIGHT LOWER EXTREMITY: ICD-10-CM
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
## 2024-09-23 DIAGNOSIS — S20.211A CONTUSION OF RIGHT FRONT WALL OF THORAX, INITIAL ENCOUNTER: ICD-10-CM

## 2024-09-23 DIAGNOSIS — R07.81 RIB PAIN ON RIGHT SIDE: ICD-10-CM
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
## 2024-09-23 RX ORDER — METHYLPREDNISOLONE 4 MG/1
TABLET ORAL
Qty: 21 TABLET | Refills: 0 | Status: SHIPPED | OUTPATIENT
Start: 2024-09-23 | End: 2024-09-30

## 2024-09-23 RX ORDER — TRAMADOL HYDROCHLORIDE 50 MG/1
50 TABLET ORAL EVERY 8 HOURS PRN
Qty: 10 TABLET | Refills: 0 | Status: SHIPPED | OUTPATIENT
Start: 2024-09-23 | End: 2024-09-28

## 2024-09-23 ASSESSMENT — ENCOUNTER SYMPTOMS
GASTROINTESTINAL NEGATIVE: 1
RESPIRATORY NEGATIVE: 1
PSYCHIATRIC NEGATIVE: 1
CONSTITUTIONAL NEGATIVE: 1
SHORTNESS OF BREATH: 0
HEMATOLOGIC/LYMPHATIC NEGATIVE: 1
ALLERGIC/IMMUNOLOGIC NEGATIVE: 1
EYES NEGATIVE: 1
ENDOCRINE NEGATIVE: 1
NEUROLOGICAL NEGATIVE: 1
MUSCULOSKELETAL NEGATIVE: 1
PALPITATIONS: 0

## 2024-09-23 ASSESSMENT — PAIN SCALES - GENERAL: PAINLEVEL: 8

## 2024-09-23 NOTE — PROGRESS NOTES
Subjective   Patient ID: Harsha Barber is a 74 y.o. male. They present today with a chief complaint of Other (Right chest and lower leg/calf injury from fall x 3 days).    History of Present Illness  HPI  Pt presents to urgent care with c/o  R sided rib pain and RLE pain s/p fall from law mower 3 days ago.  States pain is worse with weight bearing and worse with deep breath.  He denies hitting his head, denies loc.  Pt denies CP, SOB, palpitations, fevers, abd pain, n/v/d, sick contacts, recent travel.        Past Medical History  Allergies as of 09/23/2024 - Reviewed 09/23/2024   Allergen Reaction Noted    Aspirin Shortness of breath and Swelling 11/02/2007    Bee venom protein (honey bee) Anaphylaxis 04/24/2023       (Not in a hospital admission)       Past Medical History:   Diagnosis Date    Acute upper respiratory infection, unspecified 03/05/2021    Acute upper respiratory infection    Closed fracture of lateral malleolus 08/21/2008    Closed nondisplaced transverse fracture of shaft of right fibula 10/24/2016    Encounter for immunization 07/17/2019    Need for 23-polyvalent pneumococcal polysaccharide vaccine    Encounter for immunization 07/17/2019    Need for shingles vaccine    Encounter for immunization 07/17/2019    Need for Tdap vaccination    Other conditions influencing health status 03/12/2021    History of cough    Personal history of malignant melanoma of skin 01/21/2022    History of malignant melanoma of skin    Personal history of other endocrine, nutritional and metabolic disease 01/20/2021    History of vitamin D deficiency    Personal history of urinary (tract) infections 04/06/2021    History of urinary tract infection    Severe acute respiratory syndrome coronavirus 2 (SARS-CoV-2) detected 01/11/2024    Unspecified cataract 02/28/2019    Cataracts, bilateral    Vitamin D deficiency 01/11/2024       Past Surgical History:   Procedure Laterality Date    OTHER SURGICAL HISTORY  02/28/2019     "Bunionectomy    OTHER SURGICAL HISTORY  02/28/2019    Knee arthroscopy    OTHER SURGICAL HISTORY  02/28/2019    Rotator cuff repair    OTHER SURGICAL HISTORY  02/28/2019    Cataract surgery    OTHER SURGICAL HISTORY  02/25/2022    Foot surgery    OTHER SURGICAL HISTORY  02/25/2022    Excision melanoma    OTHER SURGICAL HISTORY  04/06/2020    Colonoscopy    OTHER SURGICAL HISTORY  07/17/2019    Skin biopsy        reports that he has quit smoking. His smoking use included cigarettes. He has quit using smokeless tobacco. He reports that he does not currently use alcohol after a past usage of about 2.0 standard drinks of alcohol per week. He reports that he does not use drugs.    Review of Systems  Review of Systems   Constitutional: Negative.    HENT: Negative.     Eyes: Negative.    Respiratory: Negative.  Negative for shortness of breath.    Cardiovascular:  Negative for chest pain and palpitations.   Gastrointestinal: Negative.    Endocrine: Negative.    Genitourinary: Negative.    Musculoskeletal: Negative.    Skin: Negative.    Allergic/Immunologic: Negative.    Neurological: Negative.    Hematological: Negative.    Psychiatric/Behavioral: Negative.     All other systems reviewed and are negative.                                 Objective    Vitals:    09/23/24 1238   BP: 152/81   BP Location: Left arm   Patient Position: Sitting   Pulse: 69   Resp: 20   Temp: 36.9 °C (98.4 °F)   TempSrc: Oral   SpO2: 94%   Weight: 87.5 kg (193 lb)   Height: 1.753 m (5' 9\")     No LMP for male patient.    Physical Exam  Vitals and nursing note reviewed.   Constitutional:       General: He is not in acute distress.     Appearance: Normal appearance. He is not ill-appearing or toxic-appearing.   HENT:      Head: Atraumatic.      Right Ear: Tympanic membrane, ear canal and external ear normal.      Left Ear: Tympanic membrane, ear canal and external ear normal.      Nose: Nose normal.      Mouth/Throat:      Mouth: Mucous membranes " are moist.      Pharynx: Oropharynx is clear. No oropharyngeal exudate or posterior oropharyngeal erythema.   Eyes:      Extraocular Movements: Extraocular movements intact.      Conjunctiva/sclera: Conjunctivae normal.      Pupils: Pupils are equal, round, and reactive to light.   Cardiovascular:      Rate and Rhythm: Normal rate and regular rhythm.      Pulses: Normal pulses.      Heart sounds: Normal heart sounds.   Pulmonary:      Effort: Pulmonary effort is normal.      Breath sounds: Normal breath sounds.   Chest:       Abdominal:      General: Abdomen is flat. Bowel sounds are normal.      Palpations: Abdomen is soft.      Tenderness: There is no abdominal tenderness.   Musculoskeletal:         General: Normal range of motion.      Cervical back: Normal range of motion and neck supple. No tenderness.      Right lower leg: Tenderness present.        Legs:    Skin:     General: Skin is warm and dry.      Capillary Refill: Capillary refill takes less than 2 seconds.   Neurological:      General: No focal deficit present.      Mental Status: He is alert and oriented to person, place, and time.   Psychiatric:         Mood and Affect: Mood normal.         Behavior: Behavior normal.         Thought Content: Thought content normal.         Procedures    Point of Care Test & Imaging Results from this visit  No results found for this visit on 09/23/24.   XR tibia fibula right 2 views    Result Date: 9/23/2024  Interpreted By:  Jonnathan Dalton, STUDY: XR TIBIA FIBULA RIGHT 2 VIEWS 9/23/2024 1:37 pm   INDICATION: Signs/Symptoms:fall, leg pain   COMPARISON: None.   ACCESSION NUMBER(S): [id number]   ORDERING CLINICIAN: MARION WALTERS   TECHNIQUE: Four views of the right tibia and fibula including AP and lateral projections were obtained.   FINDINGS: There is no evidence of acute fracture or dislocation identified. Mild hypertrophic degenerative changes are seen throughout the right knee.       1. No fracture or dislocation.  2. Degenerative changes, as described above.   MACRO: None.   Signed by: Jonnathan Dalton 9/23/2024 1:43 PM Dictation workstation:   [id number]    XR chest 2 views    Result Date: 9/23/2024  Interpreted By:  Hitesh Dior, STUDY: XR CHEST 2 VIEWS;  9/23/2024 1:37 pm   INDICATION: Signs/Symptoms:fall, R rib pain.   ,W19.XXXA Unspecified fall, initial encounter,R07.81 Pleurodynia   COMPARISON: None.   ACCESSION NUMBER(S): [id number]   ORDERING CLINICIAN: MARION WALTERS   FINDINGS:         CARDIOMEDIASTINAL SILHOUETTE: Cardiomediastinal silhouette is normal in size and configuration.   LUNGS: Lungs are remarkable for atelectasis and/or linear scarring in the left lung base.   ABDOMEN: No remarkable upper abdominal findings.   BONES: No acute osseous changes. Multilevel discogenic degenerative changes.       1.  No evidence of acute cardiopulmonary process.       MACRO: None   Signed by: Hitesh Dior 9/23/2024 1:42 PM Dictation workstation:   [id number]     Diagnostic study results (if any) were reviewed by VANDA Glez.    Assessment/Plan   Allergies, medications, history, and pertinent labs/EKGs/Imaging reviewed by VANDA Glez.     Medical Decision Making  Pt presents with R rib and RLE pain s/p falling off law mower and landing on R side.  Lung sounds CTA.  No ecchymosis, rash, deformity noted.  Pt also reports pain in posterior calf.  No redness, swelling, deformity noted.  MSPs intact.  Xrays of chest and tib fib negative for acute abnormalities per rad report. Suspect fall, contusions, msk pain as cause of pt's symptoms.  Pt states he has been unable to sleep due to pain.  Will give pt Rx Ultram, Medrol Dose pack.  Pt advised to follow up with PCP.  At time of discharge patient was clinically well-appearing and HDS for outpatient management. The patient and/or family was educated regarding diagnosis, supportive care, OTC and Rx medications. The patient and/or family was given the  opportunity to ask questions prior to discharge.  They verbalized understanding of my discussion of the plans for treatment, expected course, indications to return to  or seek further evaluation in ED, and the need for timely follow up as directed.     Orders and Diagnoses  Diagnoses and all orders for this visit:  Fall, initial encounter  -     XR chest 2 views; Future  -     XR tibia fibula right 2 views; Future  -     traMADol (Ultram) 50 mg tablet; Take 1 tablet (50 mg) by mouth every 8 hours if needed for severe pain (7 - 10) for up to 5 days.  -     methylPREDNISolone (Medrol Dospak) 4 mg tablets; Take as directed on package.  Rib pain on right side  -     XR chest 2 views; Future  -     traMADol (Ultram) 50 mg tablet; Take 1 tablet (50 mg) by mouth every 8 hours if needed for severe pain (7 - 10) for up to 5 days.  -     methylPREDNISolone (Medrol Dospak) 4 mg tablets; Take as directed on package.  Diffuse pain in right lower extremity  -     XR tibia fibula right 2 views; Future  -     traMADol (Ultram) 50 mg tablet; Take 1 tablet (50 mg) by mouth every 8 hours if needed for severe pain (7 - 10) for up to 5 days.  -     methylPREDNISolone (Medrol Dospak) 4 mg tablets; Take as directed on package.  Contusion of right front wall of thorax, initial encounter  -     traMADol (Ultram) 50 mg tablet; Take 1 tablet (50 mg) by mouth every 8 hours if needed for severe pain (7 - 10) for up to 5 days.  -     methylPREDNISolone (Medrol Dospak) 4 mg tablets; Take as directed on package.      Medical Admin Record      Patient disposition: Home    Electronically signed by VANDA Glez  2:50 PM

## 2024-10-07 ENCOUNTER — OFFICE VISIT (OUTPATIENT)
Dept: ORTHOPEDIC SURGERY | Facility: CLINIC | Age: 74
End: 2024-10-07
Payer: MEDICARE

## 2024-10-07 DIAGNOSIS — M76.61 ACHILLES TENDINITIS OF RIGHT LOWER EXTREMITY: Primary | ICD-10-CM

## 2024-10-07 PROCEDURE — L3332 SHOE LIFTS TAPERED TO ONE-HA: HCPCS | Performed by: INTERNAL MEDICINE

## 2024-10-07 PROCEDURE — L4361 PNEUMA/VAC WALK BOOT PRE OTS: HCPCS | Performed by: INTERNAL MEDICINE

## 2024-10-07 PROCEDURE — 99203 OFFICE O/P NEW LOW 30 MIN: CPT | Performed by: INTERNAL MEDICINE

## 2024-10-07 NOTE — PROGRESS NOTES
Acute Injury New Patient Visit    CC: No chief complaint on file.      HPI: Harsha is a 74 y.o. male presents today for evaluation for acute right ankle injury sustained about two weeks ago after he twisted his right ankle stepping down from his mower. He notes persistent right ankle pain. He was evaluated at the urgent care and had x-rays taken.  He was placed on steroid pills which initially helped with his pain, however the pain has reoccurred.  He is here for initial evaluation.        Review of Systems   GENERAL: Negative for malaise, significant weight loss, fever  MUSCULOSKELETAL: See HPI  NEURO:  Negative for numbness / tingling     Past Medical History  Past Medical History:   Diagnosis Date    Acute upper respiratory infection, unspecified 03/05/2021    Acute upper respiratory infection    Closed fracture of lateral malleolus 08/21/2008    Closed nondisplaced transverse fracture of shaft of right fibula 10/24/2016    Encounter for immunization 07/17/2019    Need for 23-polyvalent pneumococcal polysaccharide vaccine    Encounter for immunization 07/17/2019    Need for shingles vaccine    Encounter for immunization 07/17/2019    Need for Tdap vaccination    Other conditions influencing health status 03/12/2021    History of cough    Personal history of malignant melanoma of skin 01/21/2022    History of malignant melanoma of skin    Personal history of other endocrine, nutritional and metabolic disease 01/20/2021    History of vitamin D deficiency    Personal history of urinary (tract) infections 04/06/2021    History of urinary tract infection    Severe acute respiratory syndrome coronavirus 2 (SARS-CoV-2) detected 01/11/2024    Unspecified cataract 02/28/2019    Cataracts, bilateral    Vitamin D deficiency 01/11/2024       Medication review  Medication Documentation Review Audit       Reviewed by Yolanda De La O MA (Medical Assistant) on 07/22/24 at 0906      Medication Order Taking? Sig Documenting  Provider Last Dose Status   amLODIPine (Norvasc) 5 mg tablet [id number] Yes Take 1 tablet (5 mg) by mouth once daily. Lewis Diehl DO Taking Active   atenolol (Tenormin) 100 mg tablet [id number] Yes 1 TABLET AT HOME THE MORNING OF YOUR CORONARY CT ANGIOGRAPHY Aden Sequeira MD Taking Active   atorvastatin (Lipitor) 80 mg tablet [id number] Yes Take 1 tablet (80 mg) by mouth once daily at bedtime. Lewis Diehl DO Taking Active   cholecalciferol (Vitamin D-3) 50 mcg (2,000 unit) capsule [id number] Yes Take 1 capsule (50 mcg) by mouth once daily. Historical Provider, MD Taking Active   EPINEPHrine 0.3 mg/0.3 mL injection syringe [id number] Yes Inject 0.3 mL (0.3 mg) as directed if needed for anaphylaxis. Lewis Diehl DO Taking Active   etodolac (Lodine) 400 mg tablet [id number] Yes TAKE 1 TABLET BY MOUTH 2 TIMES A DAY AS NEEDED Lewis Diehl DO Taking Active   ezetimibe (Zetia) 10 mg tablet [id number] Yes Take 1 tablet (10 mg) by mouth once daily at bedtime. Lewis Diehl DO Taking Active   levothyroxine (Synthroid, Levoxyl) 75 mcg tablet [id number] Yes TAKE 1 TABLET BY MOUTH DAILY Lewis Diehl DO Taking Active   nitroglycerin (Nitrostat) 0.4 mg SL tablet [id number] Yes Place 1 tablet (0.4 mg) under the tongue every 5 minutes if needed for chest pain. May repeat dose every 5 minutes for up to 3 doses total. Aden Sequeira MD Taking Active   omeprazole (PriLOSEC) 20 mg DR capsule [id number] Yes TAKE 1 CAPSULE BY MOUTH DAILY IN THE MORNING BEFORE BREAKFAST Lewis Diehl DO Taking Active   pindolol (Visken) 10 mg tablet [id number] Yes Take 1 tablet (10 mg) by mouth once daily. Lewis Diehl DO Taking Active   tiZANidine (Zanaflex) 4 mg tablet [id number]  Take 1 tablet (4 mg) by mouth 3 times a day as needed for muscle spasms. Lewis Diehl DO   24 [id number]                    Allergies  Allergies   Allergen Reactions    Aspirin Shortness of breath and Swelling    Bee Venom Protein (Honey  Bee) Anaphylaxis       Social History  Social History     Socioeconomic History    Marital status:      Spouse name: Not on file    Number of children: Not on file    Years of education: Not on file    Highest education level: Not on file   Occupational History    Not on file   Tobacco Use    Smoking status: Former     Types: Cigarettes    Smokeless tobacco: Former   Vaping Use    Vaping status: Never Used   Substance and Sexual Activity    Alcohol use: Not Currently     Alcohol/week: 2.0 standard drinks of alcohol     Types: 2 Standard drinks or equivalent per week    Drug use: Never    Sexual activity: Not on file   Other Topics Concern    Not on file   Social History Narrative    Not on file     Social Determinants of Health     Financial Resource Strain: Not on file   Food Insecurity: Not on file   Transportation Needs: Not on file   Physical Activity: Not on file   Stress: Not on file   Social Connections: Not on file   Intimate Partner Violence: Not on file   Housing Stability: Not on file       Surgical History  Past Surgical History:   Procedure Laterality Date    OTHER SURGICAL HISTORY  02/28/2019    Bunionectomy    OTHER SURGICAL HISTORY  02/28/2019    Knee arthroscopy    OTHER SURGICAL HISTORY  02/28/2019    Rotator cuff repair    OTHER SURGICAL HISTORY  02/28/2019    Cataract surgery    OTHER SURGICAL HISTORY  02/25/2022    Foot surgery    OTHER SURGICAL HISTORY  02/25/2022    Excision melanoma    OTHER SURGICAL HISTORY  04/06/2020    Colonoscopy    OTHER SURGICAL HISTORY  07/17/2019    Skin biopsy       Physical Exam:  GENERAL:  Patient is awake, alert, and oriented to person place and time.  Patient appears well nourished and well kept.  Affect Calm, Not Acutely Distressed.  HEENT:  Normocephalic, Atraumatic, EOMI  CARDIOVASCULAR:  Hemodynamically stable.  RESPIRATORY:  Normal respirations with unlabored breathing.  Extremity: Right ankle and lower leg shows skin is intact.  There is no erythema  or warmth.  There is no clinical signs of infection.  There is no pain over the lateral malleolus.  There is no pain of the medial malleolus.  There is no pain over the ATF, CF or PTF ligament.  There is no pain over the deltoid ligament.  Pain over the Achilles tendon, main mainly of the distal Achilles tendon.  Mild retrocalcaneal bursa swelling.  No pain over the myotendinous junction.  Negative Gale's test Achilles tendon is intact.  Negative squeeze test.  Negative anterior drawer test.  Negative talar tilt test.  No pain over the anterior process of the talus.  There is no pain over the talar dome.  There is no pain at the base of the fifth metatarsal bone.  No pain of the calcaneus.  No pain over the plantar aponeurosis.  No pain of the midfoot.  He is neurovascularly intact.      Diagnostics: X-rays reviewed  XR tibia fibula right 2 views  Narrative: Interpreted By:  Jonnathan Dalton,   STUDY:  XR TIBIA FIBULA RIGHT 2 VIEWS 9/23/2024 1:37 pm      INDICATION:  Signs/Symptoms:fall, leg pain      COMPARISON:  None.      ACCESSION NUMBER(S):  [id number]      ORDERING CLINICIAN:  MARION WALTERS      TECHNIQUE:  Four views of the right tibia and fibula including AP and lateral  projections were obtained.      FINDINGS:  There is no evidence of acute fracture or dislocation identified.  Mild hypertrophic degenerative changes are seen throughout the right  knee.      Impression: 1. No fracture or dislocation.  2. Degenerative changes, as described above.      MACRO:  None.      Signed by: Jonnathan Dalton 9/23/2024 1:43 PM  Dictation workstation:   [id number]  XR chest 2 views  Narrative: Interpreted By:  Hitesh Dior,   STUDY:  XR CHEST 2 VIEWS;  9/23/2024 1:37 pm      INDICATION:  Signs/Symptoms:fall, R rib pain.      ,W19.XXXA Unspecified fall, initial encounter,R07.81 Pleurodynia      COMPARISON:  None.      ACCESSION NUMBER(S):  [id number]      ORDERING CLINICIAN:  MARION WALTERS      FINDINGS:                   CARDIOMEDIASTINAL SILHOUETTE:  Cardiomediastinal silhouette is normal in size and configuration.      LUNGS:  Lungs are remarkable for atelectasis and/or linear scarring in the  left lung base.      ABDOMEN:  No remarkable upper abdominal findings.      BONES:  No acute osseous changes. Multilevel discogenic degenerative changes.      Impression: 1.  No evidence of acute cardiopulmonary process.              MACRO:  None      Signed by: Hitesh Dior 9/23/2024 1:42 PM  Dictation workstation:   [id number]      Procedure: None    Assessment: Right achilles tendonitis with possible partial tear    Plan: Harsha presents today for initial evaluation for acute right ankle injury sustained about two weeks ago.  He has a history and physical examination of Achilles tendinitis, with possible low-grade partial tearing. We placed him into a fracture boot with heel lift when ambulating and physical therapy.  He may discontinue the fracture boot once he is pain-free he will follow-up in 4-5 weeks for reevaluation.  If no improvement may consider further advanced imaging.    No orders of the defined types were placed in this encounter.     At the conclusion of the visit there were no further questions by the patient/family regarding their plan of care.  Patient was instructed to call or return with any issues, questions, or concerns regarding their injury and/or treatment plan described above.     10/07/24 at 1:27 PM - Yana Grey MD  Scribe Attestation  By signing my name below, I, Dontrell Hebert, Scribmariza   attest that this documentation has been prepared under the direction and in the presence of Yana Grey MD.    Office: [phone]    This note was prepared using voice recognition software.  The details of this note are correct and have been reviewed, and corrected to the best of my ability.  Some grammatical errors may persist related to the Dragon software.

## 2024-10-29 ENCOUNTER — APPOINTMENT (OUTPATIENT)
Dept: PHYSICAL THERAPY | Facility: HOSPITAL | Age: 74
End: 2024-10-29
Payer: MEDICARE

## 2024-11-01 ENCOUNTER — EVALUATION (OUTPATIENT)
Dept: PHYSICAL THERAPY | Facility: CLINIC | Age: 74
End: 2024-11-01
Payer: MEDICARE

## 2024-11-01 DIAGNOSIS — M76.60 ACHILLES TENDON PAIN: Primary | ICD-10-CM

## 2024-11-01 DIAGNOSIS — M76.61 ACHILLES TENDINITIS OF RIGHT LOWER EXTREMITY: ICD-10-CM

## 2024-11-01 PROCEDURE — 97162 PT EVAL MOD COMPLEX 30 MIN: CPT | Mod: GP

## 2024-11-01 PROCEDURE — 97110 THERAPEUTIC EXERCISES: CPT | Mod: GP

## 2024-11-01 ASSESSMENT — ENCOUNTER SYMPTOMS
LOSS OF SENSATION IN FEET: 1
DEPRESSION: 0
OCCASIONAL FEELINGS OF UNSTEADINESS: 0

## 2024-11-01 ASSESSMENT — PATIENT HEALTH QUESTIONNAIRE - PHQ9
2. FEELING DOWN, DEPRESSED OR HOPELESS: NOT AT ALL
SUM OF ALL RESPONSES TO PHQ9 QUESTIONS 1 AND 2: 0
1. LITTLE INTEREST OR PLEASURE IN DOING THINGS: NOT AT ALL

## 2024-11-05 ENCOUNTER — HOSPITAL ENCOUNTER (OUTPATIENT)
Dept: RADIOLOGY | Facility: HOSPITAL | Age: 74
Discharge: HOME | End: 2024-11-05
Payer: MEDICARE

## 2024-11-05 ENCOUNTER — APPOINTMENT (OUTPATIENT)
Dept: ORTHOPEDIC SURGERY | Facility: CLINIC | Age: 74
End: 2024-11-05
Payer: MEDICARE

## 2024-11-05 DIAGNOSIS — S86.011A ACHILLES TENDON TEAR, RIGHT, INITIAL ENCOUNTER: ICD-10-CM

## 2024-11-05 PROCEDURE — 1159F MED LIST DOCD IN RCRD: CPT | Performed by: INTERNAL MEDICINE

## 2024-11-05 PROCEDURE — 73721 MRI JNT OF LWR EXTRE W/O DYE: CPT | Mod: RIGHT SIDE | Performed by: STUDENT IN AN ORGANIZED HEALTH CARE EDUCATION/TRAINING PROGRAM

## 2024-11-05 PROCEDURE — 99213 OFFICE O/P EST LOW 20 MIN: CPT | Performed by: INTERNAL MEDICINE

## 2024-11-05 PROCEDURE — 73721 MRI JNT OF LWR EXTRE W/O DYE: CPT | Mod: RT

## 2024-11-05 PROCEDURE — 1036F TOBACCO NON-USER: CPT | Performed by: INTERNAL MEDICINE

## 2024-11-05 NOTE — PROGRESS NOTES
CC:   Chief Complaint   Patient presents with    Right Ankle - Pain     Patient fell 9/20/24.   Achilles tendonitis w/ possible partial tear       HPI: Harsha is a 74 y.o. male presents today for reevaluation for right achilles tendinosis with partial tear. He states that he is doing better. He states that he just went to his first physical therapy session. He also notes that he recently mis stepped and re-injured his right ankle, last week on Thursday, and since then he has noticed increased pain and weakness, felt a sharp pain and pop when he misjudged the step last week..          Review of Systems   GENERAL: Negative for malaise, significant weight loss, fever  MUSCULOSKELETAL: See HPI  NEURO:  Negative for numbness / tingling     Past Medical History  Past Medical History:   Diagnosis Date    Acute upper respiratory infection, unspecified 03/05/2021    Acute upper respiratory infection    Closed fracture of lateral malleolus 08/21/2008    Closed nondisplaced transverse fracture of shaft of right fibula 10/24/2016    Encounter for immunization 07/17/2019    Need for 23-polyvalent pneumococcal polysaccharide vaccine    Encounter for immunization 07/17/2019    Need for shingles vaccine    Encounter for immunization 07/17/2019    Need for Tdap vaccination    Other conditions influencing health status 03/12/2021    History of cough    Personal history of malignant melanoma of skin 01/21/2022    History of malignant melanoma of skin    Personal history of other endocrine, nutritional and metabolic disease 01/20/2021    History of vitamin D deficiency    Personal history of urinary (tract) infections 04/06/2021    History of urinary tract infection    Severe acute respiratory syndrome coronavirus 2 (SARS-CoV-2) detected 01/11/2024    Unspecified cataract 02/28/2019    Cataracts, bilateral    Vitamin D deficiency 01/11/2024       Medication review  Medication Documentation Review Audit       Reviewed by Kasandra  NADIA Mcguire (Medical Assistant) on 24 at 0919      Medication Order Taking? Sig Documenting Provider Last Dose Status   amLODIPine (Norvasc) 5 mg tablet [id number]  Take 1 tablet (5 mg) by mouth once daily. Lewis Diehl DO  Active   atorvastatin (Lipitor) 80 mg tablet [id number]  Take 1 tablet (80 mg) by mouth once daily at bedtime. Lewis Diehl DO  Active   cholecalciferol (Vitamin D-3) 50 mcg (2,000 unit) capsule [id number] No Take 1 capsule (50 mcg) by mouth once daily. Historical Provider, MD Taking Active   EPINEPHrine 0.3 mg/0.3 mL injection syringe [id number] No Inject 0.3 mL (0.3 mg) as directed if needed for anaphylaxis. Lewis Diehl DO Taking  24 235   etodolac (Lodine) 400 mg tablet [id number]  TAKE 1 TABLET BY MOUTH 2 TIMES A DAY AS NEEDED Lewis Diehl DO  Active   ezetimibe (Zetia) 10 mg tablet [id number]  Take 1 tablet (10 mg) by mouth once daily at bedtime. Lewis Diehl DO  Active   fluticasone (Flonase) 50 mcg/actuation nasal spray [id number]  Administer 2 sprays into each nostril once daily. Shake gently. Before first use, prime pump. After use, clean tip and replace cap. Lewis Diehl DO  Active   levothyroxine (Synthroid, Levoxyl) 75 mcg tablet [id number]  Take 1 tablet (75 mcg) by mouth once daily. Lewis Diehl DO  Active   nitroglycerin (Nitrostat) 0.4 mg SL tablet [id number] No Place 1 tablet (0.4 mg) under the tongue every 5 minutes if needed for chest pain. May repeat dose every 5 minutes for up to 3 doses total. Aden Sequeira MD Taking Active   omeprazole (PriLOSEC) 20 mg DR capsule [id number]  Take 1 capsule (20 mg) by mouth once daily in the morning. Take before meals. Lewis Diehl DO  Active   pindolol (Visken) 10 mg tablet [id number]  Take 1 tablet (10 mg) by mouth once daily. Lewis Diehl DO  Active   tiZANidine (Zanaflex) 4 mg tablet [id number] No Take 1 tablet (4 mg) by mouth 3 times a day as needed for muscle spasms. Lewis Diehl,  DO Taking  24 0430                    Allergies  Allergies   Allergen Reactions    Aspirin Shortness of breath and Swelling    Bee Venom Protein (Honey Bee) Anaphylaxis       Social History  Social History     Socioeconomic History    Marital status:      Spouse name: Not on file    Number of children: Not on file    Years of education: Not on file    Highest education level: Not on file   Occupational History    Not on file   Tobacco Use    Smoking status: Former     Types: Cigarettes    Smokeless tobacco: Former   Vaping Use    Vaping status: Never Used   Substance and Sexual Activity    Alcohol use: Not Currently     Alcohol/week: 2.0 standard drinks of alcohol     Types: 2 Standard drinks or equivalent per week    Drug use: Never    Sexual activity: Not on file   Other Topics Concern    Not on file   Social History Narrative    Not on file     Social Drivers of Health     Financial Resource Strain: Not on file   Food Insecurity: Not on file   Transportation Needs: Not on file   Physical Activity: Not on file   Stress: Not on file   Social Connections: Not on file   Intimate Partner Violence: Not on file   Housing Stability: Not on file       Surgical History  Past Surgical History:   Procedure Laterality Date    OTHER SURGICAL HISTORY  2019    Bunionectomy    OTHER SURGICAL HISTORY  2019    Knee arthroscopy    OTHER SURGICAL HISTORY  2019    Rotator cuff repair    OTHER SURGICAL HISTORY  2019    Cataract surgery    OTHER SURGICAL HISTORY  2022    Foot surgery    OTHER SURGICAL HISTORY  2022    Excision melanoma    OTHER SURGICAL HISTORY  2020    Colonoscopy    OTHER SURGICAL HISTORY  2019    Skin biopsy       Physical Exam:  GENERAL:  Patient is awake, alert, and oriented to person place and time.  Patient appears well nourished and well kept.  Affect Calm, Not Acutely Distressed.  HEENT:  Normocephalic, Atraumatic, EOMI  CARDIOVASCULAR:   Hemodynamically stable.  RESPIRATORY:  Normal respirations with unlabored breathing.  Extremity: Right ankle and lower leg shows skin is intact.  There is no erythema or warmth.  There is no clinical signs of infection.  There is no pain over the lateral malleolus.  There is no pain of the medial malleolus.  There is no pain over the ATF, CF or PTF ligament.  There is no pain over the deltoid ligament.  Pain over the Achilles tendon  No pain over the myotendinous junction.  Positive Gale's test, with palpable defect of the Achilles tendon consistent with Achilles tendon rupture.  Negative squeeze test.  Negative anterior drawer test.  Negative talar tilt test.  No pain over the anterior process of the talus.  There is no pain over the talar dome.  There is no pain at the base of the fifth metatarsal bone.  No pain of the calcaneus.  No pain over the plantar aponeurosis.  No pain of the midfoot.  He is neurovascularly intact.       Diagnostics: X-rays reviewed  XR tibia fibula right 2 views  Narrative: Interpreted By:  Jonnathan Dalton,   STUDY:  XR TIBIA FIBULA RIGHT 2 VIEWS 9/23/2024 1:37 pm      INDICATION:  Signs/Symptoms:fall, leg pain      COMPARISON:  None.      ACCESSION NUMBER(S):  [id number]      ORDERING CLINICIAN:  MARION WALTERS      TECHNIQUE:  Four views of the right tibia and fibula including AP and lateral  projections were obtained.      FINDINGS:  There is no evidence of acute fracture or dislocation identified.  Mild hypertrophic degenerative changes are seen throughout the right  knee.      Impression: 1. No fracture or dislocation.  2. Degenerative changes, as described above.      MACRO:  None.      Signed by: Jonnathan Dalton 9/23/2024 1:43 PM  Dictation workstation:   [id number]  XR chest 2 views  Narrative: Interpreted By:  Hitesh Dior,   STUDY:  XR CHEST 2 VIEWS;  9/23/2024 1:37 pm      INDICATION:  Signs/Symptoms:fall, R rib pain.      ,W19.XXXA Unspecified fall, initial  encounter,R07.81 Pleurodynia      COMPARISON:  None.      ACCESSION NUMBER(S):  [id number]      ORDERING CLINICIAN:  MARION WALTERS      FINDINGS:                  CARDIOMEDIASTINAL SILHOUETTE:  Cardiomediastinal silhouette is normal in size and configuration.      LUNGS:  Lungs are remarkable for atelectasis and/or linear scarring in the  left lung base.      ABDOMEN:  No remarkable upper abdominal findings.      BONES:  No acute osseous changes. Multilevel discogenic degenerative changes.      Impression: 1.  No evidence of acute cardiopulmonary process.              MACRO:  None      Signed by: Hitesh Dior 9/23/2024 1:42 PM  Dictation workstation:   [id number]        Procedure: None    Assessment: Right Achilles tendon rupture    Plan: Harsha presents today for reevaluation for right ankle injury, and recently aggravated his ankle last week which resulted in an Achilles tendon rupture.  He most likely had a partial tear which now is a complete tear after he misjudged a step last week.  We did recommend to continue with his walking fracture boot heel lift, recommend a stat MRI of the right ankle for preoperative planning.  He will follow-up with one my partners after the MRI to discuss surgical options.    No orders of the defined types were placed in this encounter.     At the conclusion of the visit there were no further questions by the patient/family regarding their plan of care.  Patient was instructed to call or return with any issues, questions, or concerns regarding their injury and/or treatment plan described above.     11/05/24 at 9:22 AM - Yana Grey MD  Scribe Attestation  By signing my name below, IDontrell Scrjimbo   attest that this documentation has been prepared under the direction and in the presence of Yana Grey MD.    Office: [phone]    This note was prepared using voice recognition software.  The details of this note are correct and have been reviewed, and  corrected to the best of my ability.  Some grammatical errors may persist related to the Dragon software.

## 2024-11-06 ENCOUNTER — OFFICE VISIT (OUTPATIENT)
Dept: ORTHOPEDIC SURGERY | Facility: CLINIC | Age: 74
End: 2024-11-06
Payer: MEDICARE

## 2024-11-06 DIAGNOSIS — S86.011A ACHILLES TENDON TEAR, RIGHT, INITIAL ENCOUNTER: Primary | ICD-10-CM

## 2024-11-06 PROCEDURE — L1902 AFO ANKLE GAUNTLET PRE OTS: HCPCS | Performed by: STUDENT IN AN ORGANIZED HEALTH CARE EDUCATION/TRAINING PROGRAM

## 2024-11-06 PROCEDURE — 99214 OFFICE O/P EST MOD 30 MIN: CPT | Performed by: STUDENT IN AN ORGANIZED HEALTH CARE EDUCATION/TRAINING PROGRAM

## 2024-11-06 PROCEDURE — 1159F MED LIST DOCD IN RCRD: CPT | Performed by: STUDENT IN AN ORGANIZED HEALTH CARE EDUCATION/TRAINING PROGRAM

## 2024-11-06 PROCEDURE — 1036F TOBACCO NON-USER: CPT | Performed by: STUDENT IN AN ORGANIZED HEALTH CARE EDUCATION/TRAINING PROGRAM

## 2024-11-06 NOTE — PROGRESS NOTES
Chief Complaint   Patient presents with    Right Ankle - Pain     NPV  MRI REVIEW 11/5/24       HPI  74-year-old male presents today for evaluation of his right ankle.  Patient sustained an injury at the end September and has been having pain discomfort about his ankle since then.  Initial plan was to proceed with dedicated physical therapy with walking boot.  Patient had an exacerbation and another injury 2 days ago which she misjudged a step.  This resulted in increasingly severe pain about the posterior aspect of his ankle.  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  Not able to take any time off, regarding this.  Initial recommendations were made to wear walking boot patient has been noncompliant with this as it does result in hip pain for him.    Past Medical History:   Diagnosis Date    Acute upper respiratory infection, unspecified 03/05/2021    Acute upper respiratory infection    Closed fracture of lateral malleolus 08/21/2008    Closed nondisplaced transverse fracture of shaft of right fibula 10/24/2016    Encounter for immunization 07/17/2019    Need for 23-polyvalent pneumococcal polysaccharide vaccine    Encounter for immunization 07/17/2019    Need for shingles vaccine    Encounter for immunization 07/17/2019    Need for Tdap vaccination    Other conditions influencing health status 03/12/2021    History of cough    Personal history of malignant melanoma of skin 01/21/2022    History of malignant melanoma of skin    Personal history of other endocrine, nutritional and metabolic disease 01/20/2021    History of vitamin D deficiency    Personal history of urinary (tract) infections 04/06/2021    History of urinary tract infection    Severe acute respiratory syndrome coronavirus 2 (SARS-CoV-2) detected 01/11/2024    Unspecified cataract 02/28/2019    Cataracts, bilateral    Vitamin D deficiency 01/11/2024       Past Surgical History:   Procedure Laterality Date    OTHER  SURGICAL HISTORY  02/28/2019    Bunionectomy    OTHER SURGICAL HISTORY  02/28/2019    Knee arthroscopy    OTHER SURGICAL HISTORY  02/28/2019    Rotator cuff repair    OTHER SURGICAL HISTORY  02/28/2019    Cataract surgery    OTHER SURGICAL HISTORY  02/25/2022    Foot surgery    OTHER SURGICAL HISTORY  02/25/2022    Excision melanoma    OTHER SURGICAL HISTORY  04/06/2020    Colonoscopy    OTHER SURGICAL HISTORY  07/17/2019    Skin biopsy        Allergies   Allergen Reactions    Aspirin Shortness of breath and Swelling    Bee Venom Protein (Honey Bee) Anaphylaxis        Physical exam    General: Alert and oriented to place, person, and time.  No acute distress and breathing comfortably; pleasant and cooperative with the examination.  HEENT: Head is normocephalic and atraumatic.  Neck: Supple, no visible swelling.  Cardiovascular: Good perfusion to the affected extremity.  Lungs: No audible wheezing or labored breathing.  Abdomen: Nondistended  HEME/Lymph : No visible abnormalities bilateral lower extremity    Extremity:  Right Ankle:  Skin healthy and intact  Swelling and ecchymosis noted  No tenderness to palpation: medial/lateral malleoli, lisfranc joint  + tenderness to palpation over Achilles   Palpable defect noted    Passive ROM - dorsi/plantar flexion, inversion, eversion: within normal limits  Gale test demonstrates lack of ankle flexion   Neurovascular exam normal distally  2+ dorsalis pedis pulse and good cap refill     Diagnostics:  MR ankle right wo IV contrast    Result Date: 11/6/2024  Interpreted By:  Paige Segovia, STUDY: MRI of the  right ankle without IV contrast;  11/5/2024 7:01 pm   INDICATION: Signs/Symptoms:pain.   ,S86.011A Strain of right Achilles tendon, initial encounter   COMPARISON: Right tibia and fibular radiographs dated 09/23/2024   ACCESSION NUMBER(S): [id number]   ORDERING CLINICIAN: SANJIV WILL   TECHNIQUE: MR imaging of the  right ankle was obtained  without  administration of intravenous contrast medium.   FINDINGS: TENDONS: There is mild thickening with increased intrasubstance signal in the anterior tibialis tendon, suggestive of mild tendinosis. Otherwise extensor compartment tendons demonstrate normal course without evidence of tear. There is small amount of fluid within the flexor compartment tendon sheaths, more pronounced about the posterior tibialis tendon. Otherwise flexor compartment tendons demonstrate normal course and morphology. There is diffuse thickening with increased intrasubstance signal in the peroneus longus tendon extending from the retro malleolar groove to the inframalleolar segment. This is suggestive of mild-to-moderate tendinosis. Peroneus brevis also demonstrates linear T2 hyperintense signal in the inframalleolar segment, concerning for longitudinal segmental split tear. There is reconstitution in the distal aspect at the level of insertion. There is trace fluid within the peroneal compartment tendon sheath, suggestive of mild tenosynovitis.   There is high-grade near full-thickness tear of the Achillis tendon in the pre insertional zone, about 7 cm from the level of insertion. There is retraction of the proximal and distal tendon stumps demonstrating undulating morphology. There is diffuse thickening with increased intrasubstance signal and amorphous morphology of the torn retracted tendon stumps, likely favored to be related to underlying severe tendinosis. There is up to 3 cm fluid gap between the torn retracted tendon stumps. The distal tendon stump inserting on the posterior calcaneal tuberosity also demonstrates diffuse thickening with increased intrasubstance signal, suggestive of underlying tendinosis. The plantar aponeurosis is intact.   LIGAMENTS: The anterior talofibular ligament demonstrates diffuse thickening with increased intrasubstance signal, suggestive of mild sprain as a sequela of remote trauma. The posterior talofibular  ligament is intact. The calcaneofibular ligament also demonstrates thickening with irregular morphology, suggestive of chronic sprain. The deep deltoid ligament complex demonstrates diffuse thickening with increased intrasubstance signal without definite ligamentous discontinuity. This is likely favored to be related to sequela of remote trauma. The superficial deltoid ligament complex appears grossly intact. Spring ligament is intact. The anterior inferior and posteroinferior tibiofibular ligaments are intact.   JOINTS: There is no dislocation. There is no joint effusion. The articular cartilage of the ankle joint is normal. There is no osteochondral defect in the talar dome. There are mild degenerative changes of the intertarsal articulations of the midfoot with mild subchondral bone marrow edema in the middle and medial cuneiforms. There are also mild degenerative changes of the talonavicular joint with mild thickening of the dorsal talonavicular ligament.   OSSEOUS STRUCTURES: The bone marrow signal is normal. There is no fracture or contusion. There is no marrow replacing lesion. There is osseous protrusion along the posterior aspect of the distal fibula which is only partially included in the field of view. This demonstrates intramedullary T1 hyperintense signal and corresponds to osseous excrescence as seen on prior radiograph. This is incompletely evaluated on this examination but is likely favored to be related to cortical remodeling from remote trauma.   SOFT TISSUES: There is diffuse circumferential reticular subcutaneous soft tissue edema about the distal leg extending to the included hindfoot. This is nonspecific and could be related to cellulitis versus venous stasis versus lymphedema. No evidence of obvious confluence fluid collections. The tarsal tunnel is normal. The sinus tarsi is normal with preservation of fat signal.       1. Age-indeterminate high-grade near full-thickness tear of the distal  Achillis tendon in the pre insertional zone superimposed on severe tendinosis with significant retraction of tendon stumps as described above. 2. Age-indeterminate (likely chronic) mild sprain of anterior talofibular ligament, calcaneofibular ligament and deep deltoid ligament complex. 3. Mild flexor and peroneal compartment tenosynovitis. 4. Moderate tendinosis of the peroneus longus and peroneus brevis tendons with possible longitudinal segmental split tear of the peroneus brevis tendon as described above. 5. Diffuse circumferential subcutaneous soft tissue edema about the visualized distal leg as described above. This is nonspecific and could be reactive or could be related to cellulitis versus venous stasis versus lymphedema. 6. Incidental note of focal cortical remodeling of the distal fibular diaphysis which is only partially included in the field of view and is incompletely evaluated. This was also seen on immediate prior radiographs performed on 09/23/2024. These findings in conjunction with prior radiographs is most likely favored to be related to sequela of remote trauma. Recommend correlation with history of trauma in this location.   MACRO: None   Signed by: Paige Segovia 11/6/2024 8:51 AM Dictation workstation:   [id number]       Procedure:  Procedures    Assessment: 74-year-old male with subacute Achilles tendon tear      Treatment plan:  The natural history of the condition and its associated treatment alternatives including surgical and nonsurgical options were discussed with the patient at length.  MRI findings discussed with patient in detail today discussed severe nature of injury.  Given patient age activity level we discussed both operative and nonoperative treatment modalities operative would entail open Achilles tendon repair versus nonoperative which would include functional rehabilitation with tall walking boot and heel wedges.  Patient states that he would not be able to take any time  off regarding care around his property and that he has family to take care of and would not be able to stay off of his leg.  I discussed that he would likely have residual limitations for the rest of his life given nature of tear.  Patient has already demonstrated that he has been minimally compliant with boot wear and discussed that he would likely have residual weakness given this.  He did demonstrate understanding.  Given subacute nature of injury we will also have him see orthopedic surgery foot and ankle Dr Alvarado for a second opinion.  Patient does already have a walking boot with heel wedges.  Will provide a prescription for physical therapy to initiate functional rehabilitation.  Will proceed with nonoperative treatment  Discussed that full recovery can take 1 year time    I spent 35 minutes in the visit which includes: Face-to-face and non-face-to-face time working for this specific patient.  All time was on the date of service.  Total time in activities performed include the following: Preparing to see the patient(e.g., review of test, previous progress notes of mine or another provider; obtaining and/or reviewing separately obtained history; performing a medically appropriate examination and/or counseling and educating the patient/family/caregiver; ordering medications, tests or procedures; referring and communicating with other healthcare professionals (not separately reported); documenting clinical information in the electronic and/or health record; independently interpreting results (not separately reported) and communicating results to the patient family caregiver care coordination (not separately reported).    All of the patient's questions were answered.

## 2024-11-11 ENCOUNTER — APPOINTMENT (OUTPATIENT)
Dept: PHYSICAL THERAPY | Facility: CLINIC | Age: 74
End: 2024-11-11
Payer: MEDICARE

## 2024-11-11 DIAGNOSIS — M76.60 ACHILLES TENDON PAIN: Primary | ICD-10-CM

## 2024-11-12 ENCOUNTER — APPOINTMENT (OUTPATIENT)
Dept: RADIOLOGY | Facility: HOSPITAL | Age: 74
End: 2024-11-12
Payer: MEDICARE

## 2024-11-15 ENCOUNTER — TREATMENT (OUTPATIENT)
Dept: PHYSICAL THERAPY | Facility: CLINIC | Age: 74
End: 2024-11-15
Payer: MEDICARE

## 2024-11-15 DIAGNOSIS — M76.61 ACHILLES TENDINITIS OF RIGHT LOWER EXTREMITY: ICD-10-CM

## 2024-11-15 DIAGNOSIS — M76.60 ACHILLES TENDON PAIN: Primary | ICD-10-CM

## 2024-11-15 PROCEDURE — 97110 THERAPEUTIC EXERCISES: CPT | Mod: GP,CQ

## 2024-11-15 ASSESSMENT — PAIN DESCRIPTION - DESCRIPTORS: DESCRIPTORS: ACHING

## 2024-11-15 ASSESSMENT — PAIN - FUNCTIONAL ASSESSMENT: PAIN_FUNCTIONAL_ASSESSMENT: 0-10

## 2024-11-15 ASSESSMENT — PAIN SCALES - GENERAL: PAINLEVEL_OUTOF10: 2

## 2024-11-15 NOTE — PROGRESS NOTES
Physical Therapy Treatment    Patient Name: Harsha Barber  MRN: [id number]  Today's Date: 11/15/2024  Time Calculation  Start Time: 0915  Stop Time: 0947  Time Calculation (min): 32 min  PT Therapeutic Procedures Time Entry  Therapeutic Exercise Time Entry: 25       Current Problem  1. Achilles tendon pain  Follow Up In Physical Therapy      2. Achilles tendinitis of right lower extremity  Follow Up In Physical Therapy          Subjective   General   Pt stating he is doing well at this point. Gets some soreness and aching in foot and ankle, but overall without much issue.   Insurance   [facility] Medicare  Approved 10v  Date Range 11/7/24-12/12/24  Visit 2  Precautions     Pain  Pain Assessment: 0-10  0-10 (Numeric) Pain Score: 2  Pain Type: Chronic pain  Pain Location: Ankle  Pain Orientation: Posterior  Pain Descriptors: Aching    Objective   Treatments:  Vinay 6'  Ankle 4-way GTB x20  Seated HR/TR x20  LAQ x20  Bridges x20  SLR flex x20  Toe scrunches x20  Towel wipers x20    Assessment   Pt arrives clinic lace up brace, moderate antalgia. Added several ankle mobility and intrinsic/extrinsic strengthening ex's without issue. Showing overall good mobility, but still with limited strength and control, particularly inv and eccentric PF. Updated HEP and reviewed.   Plan:  Cont to progress strength and endurance     OP EDUCATION:   Access Code: [email or web address]  URL: [email or web address]  Date: 11/15/2024  Prepared by: Adrian Vera    Exercises  - Seated Long Arc Quad  - 1 x daily - 2 x weekly - 2-3 sets - 10 reps - 3-5 seconds hold  - Supine Bridge  - 1 x daily - 2 x weekly - 2-3 sets - 10 reps - 3-5 seconds hold  - Active Straight Leg Raise with Quad Set  - 1 x daily - 7 x weekly - 2-3 sets - 10 reps  - Towel Scrunches  - 1 x daily - 7 x weekly - 1-2 sets - 10-15 reps  - Ankle Inversion Eversion Towel Slide  - 1 x daily - 7 x weekly - 1-2 sets - 10-15 reps  - Arch Lifting  - 1 x daily - 7 x weekly - 1-2 sets -  10-15 reps  - Seated Plantar Fascia Mobilization with Small Ball  - 1-2 x daily - 7 x weekly - 1 sets - 1 reps - 3-5 minutes hold  - Ankle Dorsiflexion with Resistance  - 1 x daily - 2-3 x weekly - 2-3 sets - 10 reps  - Ankle Eversion with Resistance  - 1 x daily - 2-3 x weekly - 2-3 sets - 10 reps  - Ankle Inversion with Resistance  - 1 x daily - 2-3 x weekly - 2-3 sets - 10 reps  - Ankle and Toe Plantarflexion with Resistance  - 1 x daily - 2-3 x weekly - 2-3 sets - 10 reps    Goals:

## 2024-11-19 ENCOUNTER — TREATMENT (OUTPATIENT)
Dept: PHYSICAL THERAPY | Facility: CLINIC | Age: 74
End: 2024-11-19
Payer: MEDICARE

## 2024-11-19 DIAGNOSIS — M76.60 ACHILLES TENDON PAIN: Primary | ICD-10-CM

## 2024-11-19 DIAGNOSIS — M76.61 ACHILLES TENDINITIS OF RIGHT LOWER EXTREMITY: ICD-10-CM

## 2024-11-19 PROCEDURE — 97110 THERAPEUTIC EXERCISES: CPT | Mod: GP,CQ

## 2024-11-19 ASSESSMENT — PAIN DESCRIPTION - DESCRIPTORS: DESCRIPTORS: TIGHTNESS

## 2024-11-19 ASSESSMENT — PAIN SCALES - GENERAL: PAINLEVEL_OUTOF10: 1

## 2024-11-19 ASSESSMENT — PAIN - FUNCTIONAL ASSESSMENT: PAIN_FUNCTIONAL_ASSESSMENT: 0-10

## 2024-11-19 NOTE — PROGRESS NOTES
"Physical Therapy Treatment    Patient Name: Harsha Barber  MRN: [id number]  Today's Date: 11/19/2024  Time Calculation  Start Time: 0804  Stop Time: 0845  Time Calculation (min): 41 min     PT Therapeutic Procedures Time Entry  Therapeutic Exercise Time Entry: 41                 Insurance:   [facility] Medicare  Approved 10v  Date Range 11/7/24-12/12/24  Visit 3  Assessment:   Progressed his program today w/ addition of Supine HS and Calf Stretches w/ strap and Rockerboard fwd/bkwd. Mod tightness w/ HS stretch performed today. Pt had some increased muscle fatigue w/ SLR performed today (25 reps) however could complete reps. Most challenged w/ Ankle TB Inversion dt mod weakness. Cues to not compensate w/ his knee. Overall good ability to complete today's program. He will cont to benefit from skilled PT to address his deficits and improve his overall functional ability.     Plan:   Cont to progress strength and endurance.    Current Problem  1. Achilles tendon pain  Follow Up In Physical Therapy      2. Achilles tendinitis of right lower extremity  Follow Up In Physical Therapy          Subjective   General   Pt states no current ankle pain, just some \"stiffness\". Reports compliance w/ HEP.   Precautions       Pain  Pain Assessment: 0-10  0-10 (Numeric) Pain Score: 1  Pain Type: Chronic pain  Pain Location: Ankle  Pain Orientation: Posterior  Pain Descriptors: Tightness    Objective       Treatments:  Therapeutic Exercise (13077):   Vinay 6'  Ankle 4-way GTB x20  Seated HR/TR x20  LAQ 2.5# x20  Bridges x20  SLR flex x25  Toe scrunches x20 Reviewed  Towel wipers x20 Reviewed  Supine HS Stretch w/ Strap 20\" x 3  Supine Calf Stretch w/ Strap 20\" x 3   Rockerboard F/B x20  EDUCATION:     "

## 2024-11-22 ENCOUNTER — TREATMENT (OUTPATIENT)
Dept: PHYSICAL THERAPY | Facility: CLINIC | Age: 74
End: 2024-11-22
Payer: MEDICARE

## 2024-11-22 DIAGNOSIS — M76.60 ACHILLES TENDON PAIN: Primary | ICD-10-CM

## 2024-11-22 DIAGNOSIS — M76.61 ACHILLES TENDINITIS OF RIGHT LOWER EXTREMITY: ICD-10-CM

## 2024-11-22 PROCEDURE — 97110 THERAPEUTIC EXERCISES: CPT | Mod: GP,CQ

## 2024-11-22 ASSESSMENT — PAIN SCALES - GENERAL: PAINLEVEL_OUTOF10: 2

## 2024-11-22 ASSESSMENT — PAIN - FUNCTIONAL ASSESSMENT: PAIN_FUNCTIONAL_ASSESSMENT: 0-10

## 2024-11-22 ASSESSMENT — PAIN DESCRIPTION - DESCRIPTORS: DESCRIPTORS: TIGHTNESS;ACHING

## 2024-11-22 NOTE — PROGRESS NOTES
"Physical Therapy Treatment    Patient Name: Harsha Barber  MRN: [id number]  Today's Date: 11/22/2024  Time Calculation  Start Time: 0830  Stop Time: 0908  Time Calculation (min): 38 min  PT Therapeutic Procedures Time Entry  Therapeutic Exercise Time Entry: 38       Current Problem  1. Achilles tendon pain  Follow Up In Physical Therapy      2. Achilles tendinitis of right lower extremity  Follow Up In Physical Therapy          Subjective   General   Pt states he feels okay this morning, States he's aware that the pain is there but it feels more aching/tight than sharp or shooting pain.    Insurance   [facility] Medicare  Approved 10v  Date Range 11/7/24-12/12/24  Visit 4    Precautions  None    Pain  Pain Assessment: 0-10  0-10 (Numeric) Pain Score: 2  Pain Type: Chronic pain  Pain Location: Ankle  Pain Orientation: Right, Posterior  Pain Descriptors: Tightness, Aching    Objective   Treatments:  Therapeutic Exercise (43844):   Vinay 6'  AP x20  Ankle 4-way GTB x20  Seated HR/TR x20  LAQ 2.5# x20  Bridges x20  SLR flex x20  Toe scrunches x20   Towel wipers x20   Supine HS Stretch w/ Strap 20\" x 3- NT  Supine Calf Stretch w/ Strap 20\" x 3   Rockerboard F/B x30   SLS 1' x3    Assessment   Pt ambulated into the clinic today with mild antalgia. Began on the bike just to help decrease some tightness and stiffness in the R ankle. Focused treatment on strengthening the R ankle in all planes. Pt tolerated treatment well and seems to be improving weight acceptance in the single leg stance. Continue to progress strength in the R ankle for ease of everyday activity.    Plan:  Cont to progress strength and endurance       Session Documented and Completed By: Jacinto WILDER EDUCATION:       Goals:     "

## 2024-11-26 ENCOUNTER — TREATMENT (OUTPATIENT)
Dept: PHYSICAL THERAPY | Facility: CLINIC | Age: 74
End: 2024-11-26
Payer: MEDICARE

## 2024-11-26 DIAGNOSIS — M76.61 ACHILLES TENDINITIS OF RIGHT LOWER EXTREMITY: ICD-10-CM

## 2024-11-26 DIAGNOSIS — M76.60 ACHILLES TENDON PAIN: Primary | ICD-10-CM

## 2024-11-26 PROCEDURE — 97110 THERAPEUTIC EXERCISES: CPT | Mod: GP,CQ

## 2024-11-26 ASSESSMENT — PAIN - FUNCTIONAL ASSESSMENT: PAIN_FUNCTIONAL_ASSESSMENT: 0-10

## 2024-11-26 ASSESSMENT — PAIN SCALES - GENERAL: PAINLEVEL_OUTOF10: 1

## 2024-11-26 ASSESSMENT — PAIN DESCRIPTION - DESCRIPTORS: DESCRIPTORS: TIGHTNESS

## 2024-11-26 NOTE — PROGRESS NOTES
"Physical Therapy Treatment    Patient Name: Harsha Barber  MRN: [id number]  Today's Date: 11/26/2024  Time Calculation  Start Time: 0850  Stop Time: 0929  Time Calculation (min): 39 min     PT Therapeutic Procedures Time Entry  Therapeutic Exercise Time Entry: 39                 Insurance:   [facility] Medicare  Approved 10v  Date Range 11/7/24-12/12/24  Visit 5  Assessment:   Progressed pt's program today w/ addition of 3# wt to LAQ and 1# wt to SLR. Appropriate muscle fatigue observed. Cues for pacing and holds w/ overall good f/t. Also increased resistance to Blue w/ ankle tband 4 way. Much emphasis on eccentric control to challenge pt. He fatigues quickly and is most challenged w/ SLS and cont's to require UE assistance to complete ex. He will cont to benefit from skilled PT to address his deficits and improve his overall functional ability.     Plan:   Cont to progress strength and endurance to improve overall functional ability.     Current Problem  1. Achilles tendon pain  Follow Up In Physical Therapy      2. Achilles tendinitis of right lower extremity  Follow Up In Physical Therapy          Subjective   General   Pt reports \"it still let's me know it's there.\" Min discomfort coming in for today's appt.   Precautions       Pain  Pain Assessment: 0-10  0-10 (Numeric) Pain Score: 1  Pain Type: Chronic pain  Pain Location: Ankle  Pain Orientation: Right, Posterior  Pain Descriptors: Tightness    Objective     Treatments:  Therapeutic Exercise (38451):    Vinay 6'  AP x20 --  Ankle 4-way BlueTB x20  Seated HR/TR x20  LAQ 3# x20  Bridges x20  SLR flex 1# x20  Toe scrunches x20 --  Towel wipers x20 --  Supine HS Stretch w/ Strap 30\" x 2   Supine Calf Stretch w/ Strap 20\" x 3   Rockerboard F/B x30   SLS 1' x 2    EDUCATION:  Outpatient Education  Equipment: Thera-Band (BLUE)      "

## 2024-12-03 ENCOUNTER — TREATMENT (OUTPATIENT)
Dept: PHYSICAL THERAPY | Facility: CLINIC | Age: 74
End: 2024-12-03
Payer: MEDICARE

## 2024-12-03 DIAGNOSIS — M76.60 ACHILLES TENDON PAIN: Primary | ICD-10-CM

## 2024-12-03 DIAGNOSIS — M76.61 ACHILLES TENDINITIS OF RIGHT LOWER EXTREMITY: ICD-10-CM

## 2024-12-03 PROCEDURE — 97110 THERAPEUTIC EXERCISES: CPT | Mod: GP

## 2024-12-03 NOTE — PROGRESS NOTES
"Physical Therapy Treatment    Patient Name: Harsha Barber  MRN: [id number]  Today's Date: 12/3/2024  Time Calculation  Start Time: 0923  Stop Time: 0958  Time Calculation (min): 35 min     PT Therapeutic Procedures Time Entry  Therapeutic Exercise Time Entry: 35                 Insurance:   [facility] Medicare  Approved 10v  Date Range 11/7/24-12/12/24  Visit 6  Assessment:  Pt arrived 8 mins late this visit. Pt tolerated treatment session well today. Added standing hip abd/ext for focus on SLS while strengthening hip with good tolerance. Added ankle alphabet for mobility with pt encouraged to perform in the morning when starting day to help with stiffness feeling. Pt with more difficulty wth hip exercises on L vs R. Pt will continue to benefit from skilled therapy in order to improve functional mobility.    Plan:  Continue to progress as tolerated with focus on ROM, strength, endurance    Current Problem  1. Achilles tendon pain  Follow Up In Physical Therapy      2. Achilles tendinitis of right lower extremity  Follow Up In Physical Therapy          Subjective   General  Pt states he continues to have stiffness and tightness of his ankle. He states he has a hard time with stairs but this is also d/t his knees really bothering him.  Precautions       Pain       Objective     Treatments:  Therapeutic Exercise ([id number]):    Vinay 6'  Ankle 4-way BlueTB x20  Ankle alphabet x1  Seated HR/TR x20  LAQ 3# x20  Bridges x20  SLR flex 1# x20  Supine HS Stretch w/ Strap 30\" x 2 resume NV  Supine Calf Stretch w/ Strap 20\" x 3 resume NV  Rockerboard F/B x30   Standing hip abd/ext YTB x10 ea  SLS 1' x 2 - resume NV    EDUCATION:   Access Code: [email or web address]  URL: [email or web address]  Date: 12/03/2024  Prepared by: Nyla Morgan    Exercises  - Seated Ankle Alphabet  - 1 x daily - 7 x weekly - 3 sets - 10 reps  - Standing Hip Abduction with Resistance at Ankles and Counter Support  - 1 x daily - 3-4 x weekly - 1 sets - 10 " reps  - Standing Hip Extension with Resistance at Ankles and Counter Support  - 1 x daily - 3-4 x weekly - 1 sets - 10 reps

## 2024-12-06 ENCOUNTER — TREATMENT (OUTPATIENT)
Dept: PHYSICAL THERAPY | Facility: CLINIC | Age: 74
End: 2024-12-06
Payer: MEDICARE

## 2024-12-06 DIAGNOSIS — M76.60 ACHILLES TENDON PAIN: Primary | ICD-10-CM

## 2024-12-06 DIAGNOSIS — M76.61 ACHILLES TENDINITIS OF RIGHT LOWER EXTREMITY: ICD-10-CM

## 2024-12-06 PROCEDURE — 97110 THERAPEUTIC EXERCISES: CPT | Mod: GP,CQ

## 2024-12-06 ASSESSMENT — PAIN - FUNCTIONAL ASSESSMENT: PAIN_FUNCTIONAL_ASSESSMENT: 0-10

## 2024-12-06 ASSESSMENT — PAIN SCALES - GENERAL: PAINLEVEL_OUTOF10: 0 - NO PAIN

## 2024-12-06 NOTE — PROGRESS NOTES
"Physical Therapy Treatment    Patient Name: Harsha Barber  MRN: [id number]  Today's Date: 12/6/2024  Time Calculation  Start Time: 0920  Stop Time: 1000  Time Calculation (min): 40 min  PT Therapeutic Procedures Time Entry  Therapeutic Exercise Time Entry: 39       Current Problem  1. Achilles tendon pain  Follow Up In Physical Therapy      2. Achilles tendinitis of right lower extremity  Follow Up In Physical Therapy          Subjective   General   Pt states he feels good in the R ankle just feels tight/stiff.    Insurance   [facility] Medicare  Approved 10v  Date Range 11/7/24-12/12/24  Visit 7    Precautions  None    Pain  Pain Assessment: 0-10  0-10 (Numeric) Pain Score: 0 - No pain    Objective   Treatments:  Vinay 8'  Ankle 4-way BlueTB x20  Ankle alphabet x1  LAQ 3# x20  Bridges, 3'' holds, x20  SLR flex x20  Staggered STS, no UE support, x20  HR/TR x20  SLS, compliant foam oval, 10''x10  Retro step ups, 4'' step, ascending R/L, x20 e  Rockerboard F/B x30   Standing hip abd/ext YTB x10 ea NV  SLS 1' x 2 - resume NV  Supine HS Stretch w/ Strap 30\" x 2 resume NV  Supine Calf Stretch w/ Strap 20\" x 3 resume NV    Assessment   Pt presented with no pain after initial questioning, but explained that he just felt tight and stiff in his R ankle. Began treatment on the VINAY bike to help lossen his R ankle. Focused the rest of treatment on increasing R ankle strength to improve ankle balance strategy. Showing good improved functional mobility, but still Continue to progress strength in R ankle for ease of everyday activity.    Plan:  Cont to progress strength and endurance         Session Completed and Documented By: Jacinto Roldan    OP EDUCATION:       Goals:     "

## 2024-12-10 ENCOUNTER — TREATMENT (OUTPATIENT)
Dept: PHYSICAL THERAPY | Facility: CLINIC | Age: 74
End: 2024-12-10
Payer: MEDICARE

## 2024-12-10 DIAGNOSIS — S86.011A ACHILLES TENDON TEAR, RIGHT, INITIAL ENCOUNTER: ICD-10-CM

## 2024-12-10 DIAGNOSIS — M76.60 ACHILLES TENDON PAIN: Primary | ICD-10-CM

## 2024-12-10 DIAGNOSIS — M76.61 ACHILLES TENDINITIS OF RIGHT LOWER EXTREMITY: ICD-10-CM

## 2024-12-10 PROCEDURE — 97110 THERAPEUTIC EXERCISES: CPT | Mod: GP,CQ

## 2024-12-10 PROCEDURE — 97112 NEUROMUSCULAR REEDUCATION: CPT | Mod: GP,CQ

## 2024-12-10 ASSESSMENT — PAIN SCALES - GENERAL: PAINLEVEL_OUTOF10: 0 - NO PAIN

## 2024-12-10 ASSESSMENT — PAIN - FUNCTIONAL ASSESSMENT: PAIN_FUNCTIONAL_ASSESSMENT: 0-10

## 2024-12-10 ASSESSMENT — PAIN DESCRIPTION - DESCRIPTORS: DESCRIPTORS: TIGHTNESS

## 2024-12-10 NOTE — PROGRESS NOTES
"Physical Therapy Treatment    Patient Name: Harsha Barber  MRN: [id number]  Today's Date: 12/10/2024  Time Calculation  Start Time: 0847  Stop Time: 0930  Time Calculation (min): 43 min     PT Therapeutic Procedures Time Entry  Neuromuscular Re-Education Time Entry: 8  Therapeutic Exercise Time Entry: 35                 Insurance:   [facility] Medicare  Approved 10v  Date Range 11/7/24-12/12/24  Visit 8  Assessment:   Progressed pt's program today w/ increase in tband resistance to Blk w/ ankle 4 way. Added 1/2 roll to TR and HR w/ mod challenge w/ HR dt weakness. Pt presented w/ poor eccentric control w/ ex. Improved SLS on round foam (black) this visit however he does still require min UE assistance to get balance and maintain. Pt's LE strength is nicely improving w/ skilled program.     Plan:   Cont to progress strength and endurance.  Re-check sched NV, 12/12.     Current Problem  1. Achilles tendon pain  Follow Up In Physical Therapy      2. Achilles tendinitis of right lower extremity  Follow Up In Physical Therapy      3. Achilles tendon tear, right, initial encounter  Referral to Physical Therapy          Subjective   General   Pt states the ankle is still \"stiff and tight\" but otherwise is \"feeling ok\". States his left ankle was more painful the other day.   Precautions   N/A    Pain  Pain Assessment: 0-10  0-10 (Numeric) Pain Score: 0 - No pain  Pain Type: Chronic pain  Pain Location: Ankle  Pain Orientation: Right, Posterior  Pain Descriptors: Tightness    Objective       Treatments:  Therapeutic Exercise (24816):   Vinay 8'  Ankle 4-way BlkTB x20  Ankle alphabet x1  LAQ 3# x20  Bridges, GTB 3'' holds, x20  SLR flex 2x15   Staggered STS, no UE support, x20  HR/TR 1/2 roll x20  SLS, round blk foam, 10''x10  Retro step ups, 4'' step, ascending R/L, x20 e  Rockerboard F/B x30 ---  Standing hip abd/ext YTB x15 ea   Supine HS Stretch w/ Strap 30\" x 2 resume NV  Supine Calf Stretch w/ Strap 20\" x 3 resume " NV    EDUCATION:  Outpatient Education  Equipment: Thera-Band (Black)

## 2024-12-12 ENCOUNTER — TREATMENT (OUTPATIENT)
Dept: PHYSICAL THERAPY | Facility: CLINIC | Age: 74
End: 2024-12-12
Payer: MEDICARE

## 2024-12-12 DIAGNOSIS — M76.60 ACHILLES TENDON PAIN: Primary | ICD-10-CM

## 2024-12-12 DIAGNOSIS — M76.61 ACHILLES TENDINITIS OF RIGHT LOWER EXTREMITY: ICD-10-CM

## 2024-12-12 PROCEDURE — 97112 NEUROMUSCULAR REEDUCATION: CPT | Mod: GP

## 2024-12-12 PROCEDURE — 97110 THERAPEUTIC EXERCISES: CPT | Mod: GP

## 2024-12-12 NOTE — PROGRESS NOTES
"Physical Therapy Treatment    Patient Name: Harsha Barber  MRN: [id number]  Today's Date: 12/12/2024  Time Calculation  Start Time: 0830  Stop Time: 0911  Time Calculation (min): 41 min     PT Therapeutic Procedures Time Entry  Neuromuscular Re-Education Time Entry: 16  Therapeutic Exercise Time Entry: 25                 Insurance:   [facility] Medicare  Approved 10v  Date Range 11/7/24-12/12/24  Visit 9  Assessment:  Recheck performed this visit with pt demonstrating significant improvement in ROM at this time. Pt still limited in strength and SLS balance which is effecting his daily and recreational activities such as riding horses and daily chores. Introduced several new exercises with focus on balance which was appropriately challenging for pt. Pt tends to let foot go into excessive dorsiflexion during exercises, able to correct with cues. Pt will continue to benefit from skilled therapy in order to improve functional mobility.    Plan:  Continue to progress as tolerated with focus on strength, endurance, and balance.  Therapist recommending 1x/week 4 weeks  Current Problem  1. Achilles tendon pain  Follow Up In Physical Therapy      2. Achilles tendinitis of right lower extremity  Follow Up In Physical Therapy          Subjective   General  Pt reports his ankle continues to feel \"stiff and tight\" but no pain.   Precautions   N/A    Pain       Objective   AROM  Right DF: 0-15     Left DF: 0-10    Right PF:  0-30    Left PF: 0-30    Right inversion: 0-30      Left inversion: 0-10    Right eversion:  0-10    Left eversion: 0-5               MMT  Right tibialis anterior:  4    Left tibialis anterior: 5    Right EHL/ED: 5     Left EHL/ED: 5    Right tibialis posterior: 4      Left tibialis posterior: 4+    Right peroneals: 4      Left peroneals: 5    Right gastrocnemius: 4-     Left gastrocnemius: 5      SLS < 3s      Outcome Measures:  Other Measures  Lower Extremity Funtional Score (LEFS): 58 (/80) "     Treatments:  Therapeutic Exercise (16593):  Goal review/objective measurements taken   Vinay 4'  Rockerboard F/B x20, balance x1 min   M/L, balance  Airex tandem walking with UE support x3L  Airex Side stepping x3L  Airex slow marching  - Add strengthening back in NV      Resume NV  Ankle 4-way BlkTB x20  Ankle alphabet x1  LAQ 3# x20  Bridges, GTB 3'' holds, x20  SLR flex 2x15   Staggered STS, no UE support, x20  HR/TR 1/2 roll x20  SLS, round blk foam, 10''x10  Retro step ups, 4'' step, ascending R/L, x20 e        EDUCATION:

## 2024-12-13 ENCOUNTER — APPOINTMENT (OUTPATIENT)
Dept: PHYSICAL THERAPY | Facility: CLINIC | Age: 74
End: 2024-12-13
Payer: MEDICARE

## 2024-12-13 DIAGNOSIS — M76.60 ACHILLES TENDON PAIN: Primary | ICD-10-CM

## 2024-12-20 ENCOUNTER — TELEPHONE (OUTPATIENT)
Dept: PHYSICAL THERAPY | Facility: CLINIC | Age: 74
End: 2024-12-20
Payer: MEDICARE

## 2024-12-20 DIAGNOSIS — M76.60 ACHILLES TENDON PAIN: Primary | ICD-10-CM

## 2025-01-02 ENCOUNTER — LAB (OUTPATIENT)
Dept: LAB | Facility: LAB | Age: 75
End: 2025-01-02
Payer: MEDICARE

## 2025-01-02 DIAGNOSIS — E03.9 ACQUIRED HYPOTHYROIDISM: ICD-10-CM

## 2025-01-02 DIAGNOSIS — R73.02 GLUCOSE INTOLERANCE (IMPAIRED GLUCOSE TOLERANCE): ICD-10-CM

## 2025-01-02 DIAGNOSIS — E78.2 MIXED HYPERLIPIDEMIA: ICD-10-CM

## 2025-01-02 DIAGNOSIS — I10 PRIMARY HYPERTENSION: ICD-10-CM

## 2025-01-02 LAB
ALBUMIN SERPL BCP-MCNC: 4.3 G/DL (ref 3.4–5)
ALP SERPL-CCNC: 81 U/L (ref 33–136)
ALT SERPL W P-5'-P-CCNC: 26 U/L (ref 10–52)
ANION GAP SERPL CALC-SCNC: 10 MMOL/L (ref 10–20)
AST SERPL W P-5'-P-CCNC: 25 U/L (ref 9–39)
BASOPHILS # BLD AUTO: 0.1 X10*3/UL (ref 0–0.1)
BASOPHILS NFR BLD AUTO: 2.1 %
BILIRUB SERPL-MCNC: 1.2 MG/DL (ref 0–1.2)
BUN SERPL-MCNC: 16 MG/DL (ref 6–23)
CALCIUM SERPL-MCNC: 9.4 MG/DL (ref 8.6–10.3)
CHLORIDE SERPL-SCNC: 104 MMOL/L (ref 98–107)
CHOLEST SERPL-MCNC: 170 MG/DL (ref 0–199)
CHOLESTEROL/HDL RATIO: 2.5
CO2 SERPL-SCNC: 31 MMOL/L (ref 21–32)
CREAT SERPL-MCNC: 1.03 MG/DL (ref 0.5–1.3)
EGFRCR SERPLBLD CKD-EPI 2021: 76 ML/MIN/1.73M*2
EOSINOPHIL # BLD AUTO: 0.15 X10*3/UL (ref 0–0.4)
EOSINOPHIL NFR BLD AUTO: 3.1 %
ERYTHROCYTE [DISTWIDTH] IN BLOOD BY AUTOMATED COUNT: 13.3 % (ref 11.5–14.5)
EST. AVERAGE GLUCOSE BLD GHB EST-MCNC: 103 MG/DL
GLUCOSE SERPL-MCNC: 92 MG/DL (ref 74–99)
HBA1C MFR BLD: 5.2 %
HCT VFR BLD AUTO: 46.6 % (ref 41–52)
HDLC SERPL-MCNC: 67.3 MG/DL
HGB BLD-MCNC: 15.3 G/DL (ref 13.5–17.5)
IMM GRANULOCYTES # BLD AUTO: 0.01 X10*3/UL (ref 0–0.5)
IMM GRANULOCYTES NFR BLD AUTO: 0.2 % (ref 0–0.9)
LDLC SERPL CALC-MCNC: 80 MG/DL
LYMPHOCYTES # BLD AUTO: 0.93 X10*3/UL (ref 0.8–3)
LYMPHOCYTES NFR BLD AUTO: 19.2 %
MAGNESIUM SERPL-MCNC: 2.11 MG/DL (ref 1.6–2.4)
MCH RBC QN AUTO: 29.8 PG (ref 26–34)
MCHC RBC AUTO-ENTMCNC: 32.8 G/DL (ref 32–36)
MCV RBC AUTO: 91 FL (ref 80–100)
MONOCYTES # BLD AUTO: 0.55 X10*3/UL (ref 0.05–0.8)
MONOCYTES NFR BLD AUTO: 11.4 %
NEUTROPHILS # BLD AUTO: 3.1 X10*3/UL (ref 1.6–5.5)
NEUTROPHILS NFR BLD AUTO: 64 %
NON HDL CHOLESTEROL: 103 MG/DL (ref 0–149)
NRBC BLD-RTO: 0 /100 WBCS (ref 0–0)
PLATELET # BLD AUTO: 315 X10*3/UL (ref 150–450)
POTASSIUM SERPL-SCNC: 4.7 MMOL/L (ref 3.5–5.3)
PROT SERPL-MCNC: 6.8 G/DL (ref 6.4–8.2)
RBC # BLD AUTO: 5.13 X10*6/UL (ref 4.5–5.9)
SODIUM SERPL-SCNC: 140 MMOL/L (ref 136–145)
TRIGL SERPL-MCNC: 114 MG/DL (ref 0–149)
TSH SERPL-ACNC: 3.36 MIU/L (ref 0.44–3.98)
VLDL: 23 MG/DL (ref 0–40)
WBC # BLD AUTO: 4.8 X10*3/UL (ref 4.4–11.3)

## 2025-01-02 PROCEDURE — 84443 ASSAY THYROID STIM HORMONE: CPT

## 2025-01-02 PROCEDURE — 83036 HEMOGLOBIN GLYCOSYLATED A1C: CPT

## 2025-01-02 PROCEDURE — 83735 ASSAY OF MAGNESIUM: CPT

## 2025-01-02 PROCEDURE — 80061 LIPID PANEL: CPT

## 2025-01-02 PROCEDURE — 85025 COMPLETE CBC W/AUTO DIFF WBC: CPT

## 2025-01-02 PROCEDURE — 80053 COMPREHEN METABOLIC PANEL: CPT

## 2025-01-07 ENCOUNTER — APPOINTMENT (OUTPATIENT)
Dept: PRIMARY CARE | Facility: CLINIC | Age: 75
End: 2025-01-07
Payer: MEDICARE

## 2025-01-07 VITALS
TEMPERATURE: 97.2 F | BODY MASS INDEX: 27.4 KG/M2 | SYSTOLIC BLOOD PRESSURE: 117 MMHG | OXYGEN SATURATION: 94 % | DIASTOLIC BLOOD PRESSURE: 80 MMHG | HEIGHT: 69 IN | RESPIRATION RATE: 16 BRPM | WEIGHT: 185 LBS | HEART RATE: 73 BPM

## 2025-01-07 DIAGNOSIS — Z71.89 ADVANCED DIRECTIVES, COUNSELING/DISCUSSION: ICD-10-CM

## 2025-01-07 DIAGNOSIS — M15.0 PRIMARY OSTEOARTHRITIS INVOLVING MULTIPLE JOINTS: ICD-10-CM

## 2025-01-07 DIAGNOSIS — E03.9 ACQUIRED HYPOTHYROIDISM: ICD-10-CM

## 2025-01-07 DIAGNOSIS — C43.59 MALIGNANT MELANOMA OF TRUNK: ICD-10-CM

## 2025-01-07 DIAGNOSIS — Z91.038 HYMENOPTERA ALLERGY: ICD-10-CM

## 2025-01-07 DIAGNOSIS — I10 PRIMARY HYPERTENSION: ICD-10-CM

## 2025-01-07 DIAGNOSIS — Z00.00 ROUTINE GENERAL MEDICAL EXAMINATION AT HEALTH CARE FACILITY: Primary | ICD-10-CM

## 2025-01-07 DIAGNOSIS — E78.2 MIXED HYPERLIPIDEMIA: ICD-10-CM

## 2025-01-07 PROBLEM — F41.8 SITUATIONAL ANXIETY: Status: RESOLVED | Noted: 2024-01-11 | Resolved: 2025-01-07

## 2025-01-07 PROBLEM — M76.60 ACHILLES TENDON PAIN: Status: RESOLVED | Noted: 2024-11-01 | Resolved: 2025-01-07

## 2025-01-07 PROBLEM — E66.9 OBESITY: Status: RESOLVED | Noted: 2023-04-24 | Resolved: 2025-01-07

## 2025-01-07 PROCEDURE — 99497 ADVNCD CARE PLAN 30 MIN: CPT | Performed by: FAMILY MEDICINE

## 2025-01-07 PROCEDURE — 3079F DIAST BP 80-89 MM HG: CPT | Performed by: FAMILY MEDICINE

## 2025-01-07 PROCEDURE — 99214 OFFICE O/P EST MOD 30 MIN: CPT | Performed by: FAMILY MEDICINE

## 2025-01-07 PROCEDURE — 1123F ACP DISCUSS/DSCN MKR DOCD: CPT | Performed by: FAMILY MEDICINE

## 2025-01-07 PROCEDURE — 3074F SYST BP LT 130 MM HG: CPT | Performed by: FAMILY MEDICINE

## 2025-01-07 PROCEDURE — 1036F TOBACCO NON-USER: CPT | Performed by: FAMILY MEDICINE

## 2025-01-07 PROCEDURE — 1159F MED LIST DOCD IN RCRD: CPT | Performed by: FAMILY MEDICINE

## 2025-01-07 PROCEDURE — 3008F BODY MASS INDEX DOCD: CPT | Performed by: FAMILY MEDICINE

## 2025-01-07 PROCEDURE — 1158F ADVNC CARE PLAN TLK DOCD: CPT | Performed by: FAMILY MEDICINE

## 2025-01-07 PROCEDURE — 1160F RVW MEDS BY RX/DR IN RCRD: CPT | Performed by: FAMILY MEDICINE

## 2025-01-07 PROCEDURE — G0439 PPPS, SUBSEQ VISIT: HCPCS | Performed by: FAMILY MEDICINE

## 2025-01-07 PROCEDURE — 1170F FXNL STATUS ASSESSED: CPT | Performed by: FAMILY MEDICINE

## 2025-01-07 RX ORDER — LEVOTHYROXINE SODIUM 75 UG/1
75 TABLET ORAL DAILY
Qty: 90 TABLET | Refills: 3 | Status: SHIPPED | OUTPATIENT
Start: 2025-01-07 | End: 2026-01-02

## 2025-01-07 RX ORDER — EPINEPHRINE 0.3 MG/.3ML
1 INJECTION SUBCUTANEOUS AS NEEDED
Qty: 2 EACH | Refills: 3 | Status: SHIPPED | OUTPATIENT
Start: 2025-01-07

## 2025-01-07 RX ORDER — TIZANIDINE 4 MG/1
4 TABLET ORAL 3 TIMES DAILY PRN
Qty: 180 TABLET | Refills: 1 | Status: SHIPPED | OUTPATIENT
Start: 2025-01-07 | End: 2026-01-07

## 2025-01-07 ASSESSMENT — ENCOUNTER SYMPTOMS
DIAPHORESIS: 0
FATIGUE: 0
HEADACHES: 0
APPETITE CHANGE: 0
FEVER: 0
ACTIVITY CHANGE: 0
LOSS OF SENSATION IN FEET: 0
UNEXPECTED WEIGHT CHANGE: 0
EYE ITCHING: 0
SPEECH DIFFICULTY: 0
NECK STIFFNESS: 0
HEMATURIA: 0
VOICE CHANGE: 0
EYE DISCHARGE: 0
CHEST TIGHTNESS: 0
NAUSEA: 0
WEAKNESS: 0
DYSURIA: 0
CONFUSION: 0
HALLUCINATIONS: 0
CHILLS: 0
ARTHRALGIAS: 1
VOMITING: 0
LIGHT-HEADEDNESS: 0
ABDOMINAL PAIN: 0
ADENOPATHY: 0
CONSTIPATION: 0
TROUBLE SWALLOWING: 0
BRUISES/BLEEDS EASILY: 0
POLYDIPSIA: 0
SHORTNESS OF BREATH: 0
MYALGIAS: 0
AGITATION: 0
COUGH: 0
DYSPHORIC MOOD: 0
DEPRESSION: 0
HYPERTENSION: 1
EYE PAIN: 0
BLOOD IN STOOL: 0
DIARRHEA: 0
PHOTOPHOBIA: 0
SEIZURES: 0
RHINORRHEA: 0
PALPITATIONS: 0
SLEEP DISTURBANCE: 0
TREMORS: 0
NUMBNESS: 0
FLANK PAIN: 0
NECK PAIN: 0
NERVOUS/ANXIOUS: 0
FREQUENCY: 0
OCCASIONAL FEELINGS OF UNSTEADINESS: 0
WOUND: 0
SINUS PRESSURE: 0
ABDOMINAL DISTENTION: 0
FACIAL ASYMMETRY: 0
EYE REDNESS: 0
BACK PAIN: 0
SORE THROAT: 0
JOINT SWELLING: 0
WHEEZING: 0
CHOKING: 0
DIZZINESS: 0

## 2025-01-07 ASSESSMENT — ACTIVITIES OF DAILY LIVING (ADL)
GROCERY_SHOPPING: INDEPENDENT
MANAGING_FINANCES: INDEPENDENT
DOING_HOUSEWORK: INDEPENDENT
BATHING: INDEPENDENT
DRESSING: INDEPENDENT
TAKING_MEDICATION: INDEPENDENT

## 2025-01-07 NOTE — PATIENT INSTRUCTIONS

## 2025-01-07 NOTE — PROGRESS NOTES
Subjective   Patient ID: Harsha Barber is a 74 y.o. male who presents for Medicare Annual Wellness Visit Subsequent (AND REVIEW LABS ).    Hypertension  This is a chronic problem. The current episode started more than 1 year ago. The problem is unchanged. The problem is controlled. Pertinent negatives include no chest pain, headaches, neck pain, palpitations or shortness of breath. Agents associated with hypertension include thyroid hormones and NSAIDs. Risk factors for coronary artery disease include dyslipidemia and male gender. Past treatments include beta blockers and calcium channel blockers. The current treatment provides significant improvement. There are no compliance problems.  Identifiable causes of hypertension include a hypertension causing med and a thyroid problem. There is no history of chronic renal disease.   Hyperlipidemia  This is a chronic problem. The current episode started more than 1 year ago. The problem is controlled. Recent lipid tests were reviewed and are normal. Exacerbating diseases include hypothyroidism. He has no history of chronic renal disease, diabetes, liver disease, obesity or nephrotic syndrome. Factors aggravating his hyperlipidemia include beta blockers. Pertinent negatives include no chest pain, myalgias or shortness of breath. Current antihyperlipidemic treatment includes statins and ezetimibe. The current treatment provides significant improvement of lipids. There are no compliance problems.  Risk factors for coronary artery disease include dyslipidemia, hypertension and male sex.   Thyroid Problem  Presents for follow-up visit. Patient reports no anxiety, cold intolerance, constipation, diaphoresis, diarrhea, fatigue, heat intolerance, palpitations or tremors. The symptoms have been stable. There is no history of diabetes.        Review of Systems   Constitutional:  Negative for activity change, appetite change, chills, diaphoresis, fatigue, fever and unexpected weight  "change.   HENT:  Negative for congestion, ear pain, hearing loss, nosebleeds, postnasal drip, rhinorrhea, sinus pressure, sneezing, sore throat, tinnitus, trouble swallowing and voice change.    Eyes:  Negative for photophobia, pain, discharge, redness, itching and visual disturbance.   Respiratory:  Negative for cough, choking, chest tightness, shortness of breath and wheezing.    Cardiovascular:  Negative for chest pain, palpitations and leg swelling.   Gastrointestinal:  Negative for abdominal distention, abdominal pain, blood in stool, constipation, diarrhea, nausea and vomiting.   Endocrine: Negative for cold intolerance, heat intolerance, polydipsia and polyuria.   Genitourinary:  Negative for dysuria, flank pain, frequency, hematuria and urgency.   Musculoskeletal:  Positive for arthralgias. Negative for back pain, joint swelling, myalgias, neck pain and neck stiffness.   Skin:  Negative for rash and wound.   Allergic/Immunologic: Negative for immunocompromised state.   Neurological:  Negative for dizziness, tremors, seizures, syncope, facial asymmetry, speech difficulty, weakness, light-headedness, numbness and headaches.   Hematological:  Negative for adenopathy. Does not bruise/bleed easily.   Psychiatric/Behavioral:  Negative for agitation, behavioral problems, confusion, dysphoric mood, hallucinations, self-injury, sleep disturbance and suicidal ideas. The patient is not nervous/anxious.        Objective   /80 (BP Location: Left arm, Patient Position: Sitting, BP Cuff Size: Large adult)   Pulse 73   Temp 36.2 °C (97.2 °F) (Temporal)   Resp 16   Ht 1.753 m (5' 9\")   Wt 83.9 kg (185 lb)   SpO2 94%   BMI 27.32 kg/m²     Physical Exam  Constitutional:       General: He is not in acute distress.     Appearance: He is not ill-appearing or diaphoretic.   HENT:      Head: Normocephalic and atraumatic.      Right Ear: External ear normal.      Left Ear: External ear normal.      Nose: Nose normal. No " rhinorrhea.   Eyes:      General: Lids are normal. No scleral icterus.        Right eye: No discharge.         Left eye: No discharge.      Conjunctiva/sclera: Conjunctivae normal.   Cardiovascular:      Rate and Rhythm: Normal rate and regular rhythm.      Pulses: Normal pulses.      Heart sounds: No murmur heard.  Pulmonary:      Effort: Pulmonary effort is normal. No respiratory distress.      Breath sounds: No decreased breath sounds, wheezing, rhonchi or rales.   Abdominal:      General: Bowel sounds are normal. There is no distension.      Palpations: Abdomen is soft. There is no mass.      Tenderness: There is no abdominal tenderness. There is no guarding or rebound.   Musculoskeletal:         General: No swelling or tenderness.      Cervical back: No rigidity or tenderness.      Right lower leg: No edema.      Left lower leg: No edema.      Comments: Diffuse arthritic deformities noted   Lymphadenopathy:      Cervical: No cervical adenopathy.      Upper Body:      Right upper body: No supraclavicular adenopathy.      Left upper body: No supraclavicular adenopathy.   Skin:     General: Skin is warm and dry.      Coloration: Skin is not jaundiced or pale.      Findings: No erythema, lesion or rash.   Neurological:      General: No focal deficit present.      Mental Status: He is alert and oriented to person, place, and time.      Sensory: No sensory deficit.      Motor: No weakness or tremor.      Coordination: Coordination normal.      Gait: Gait normal.   Psychiatric:         Mood and Affect: Mood normal. Affect is not inappropriate.         Behavior: Behavior normal.         Assessment/Plan   Diagnoses and all orders for this visit:  Routine general medical examination at health care facility  -     Follow Up In [facility] - PCP - Established; Future  Mixed hyperlipidemia  -     Follow Up In [facility] - PCP - Established  -     Comprehensive Metabolic Panel; Future  -     Lipid Panel;  Future  -     TSH with reflex to Free T4 if abnormal; Future  -     Follow Up In [facility] - PCP - Established; Future  Primary hypertension  -     Follow Up In [facility] - PCP - Established  -     CBC and Auto Differential; Future  -     Comprehensive Metabolic Panel; Future  -     Lipid Panel; Future  -     Magnesium; Future  -     TSH with reflex to Free T4 if abnormal; Future  -     Follow Up In [facility] - PCP - Established; Future  Acquired hypothyroidism  -     Follow Up In [facility] - PCP - Established  -     levothyroxine (Synthroid, Levoxyl) 75 mcg tablet; Take 1 tablet (75 mcg) by mouth once daily.  -     Comprehensive Metabolic Panel; Future  -     Lipid Panel; Future  -     TSH with reflex to Free T4 if abnormal; Future  -     Follow Up In [facility] - PCP - Established; Future  Advanced directives, counseling/discussion  -     Full code  -     Follow Up In [facility] - PCP - Established; Future  Malignant melanoma of trunk  -     Follow Up In [facility] - PCP - Established; Future  Hymenoptera allergy  -     EPINEPHrine 0.3 mg/0.3 mL injection syringe; Inject 0.3 mL (0.3 mg) into the muscle if needed for anaphylaxis.  -     Follow Up In [facility] - PCP - Established; Future  Primary osteoarthritis involving multiple joints  -     tiZANidine (Zanaflex) 4 mg tablet; Take 1 tablet (4 mg) by mouth 3 times a day as needed for muscle spasms.  -     Follow Up In [facility] - PCP - Established; Future    Advance care planning was discussed including living will, power of  for healthcare and living will.  Advance care planning packet will be provided to patient at discharge.  Patient was encouraged to bring in any advanced care planning paperwork to file on the chart at their own convenience.  (16min spent discussing above)    Patient was identified as a fall risk. Risk prevention instructions  provided.

## 2025-01-07 NOTE — PROGRESS NOTES
"Subjective   Reason for Visit: Harsha Barber is an 74 y.o. male here for a Medicare Wellness visit.          Reviewed all medications by prescribing practitioner or clinical pharmacist (such as prescriptions, OTCs, herbal therapies and supplements) and documented in the medical record.    HPI    Patient Care Team:  Lewis Diehl DO as PCP - General  Lewis Diehl DO as PCP - [employer] PCP  Aden Sequeira MD as Consulting Physician (Cardiology)     Review of Systems    Objective   Vitals:  /80 (BP Location: Left arm, Patient Position: Sitting, BP Cuff Size: Large adult)   Pulse 73   Temp 36.2 °C (97.2 °F) (Temporal)   Resp 16   Ht 1.753 m (5' 9\")   Wt 83.9 kg (185 lb)   SpO2 94%   BMI 27.32 kg/m²       Physical Exam    Assessment & Plan  Mixed hyperlipidemia    Orders:    Follow Up In [facility] - PCP - Established    Primary hypertension    Orders:    Follow Up In [facility] - PCP - Established    Acquired hypothyroidism    Orders:    Follow Up In [facility] - PCP - Established    Primary osteoarthritis involving multiple joints    Orders:    Follow Up In [facility] - PCP - Established    Gastroesophageal reflux disease, unspecified whether esophagitis present    Orders:    Follow Up In [facility] - PCP - Established    Arthritis    Orders:    Follow Up In [facility] - PCP - Established    Glucose intolerance (impaired glucose tolerance)    Orders:    Follow Up In [facility] - PCP - Established    Hymenoptera allergy              {AWV Counseling (Optional):24644}     "

## 2025-01-08 ENCOUNTER — TREATMENT (OUTPATIENT)
Dept: PHYSICAL THERAPY | Facility: CLINIC | Age: 75
End: 2025-01-08
Payer: MEDICARE

## 2025-01-08 DIAGNOSIS — M76.60 ACHILLES TENDON PAIN: Primary | ICD-10-CM

## 2025-01-08 DIAGNOSIS — M76.61 ACHILLES TENDINITIS OF RIGHT LOWER EXTREMITY: ICD-10-CM

## 2025-01-08 PROCEDURE — 97110 THERAPEUTIC EXERCISES: CPT | Mod: GP,CQ

## 2025-01-08 ASSESSMENT — PAIN SCALES - GENERAL: PAINLEVEL_OUTOF10: 2

## 2025-01-08 ASSESSMENT — PAIN - FUNCTIONAL ASSESSMENT: PAIN_FUNCTIONAL_ASSESSMENT: 0-10

## 2025-01-08 ASSESSMENT — PAIN DESCRIPTION - DESCRIPTORS: DESCRIPTORS: TIGHTNESS

## 2025-01-08 NOTE — PROGRESS NOTES
"Physical Therapy Treatment    Patient Name: Harsha Barber  MRN: [id number]  Today's Date: 1/8/2025  Time Calculation  Start Time: 0920  Stop Time: 1003  Time Calculation (min): 43 min    Insurance  [employer] MEDICARE APPROVED  10  PT VISITS 11-7-24 THRU 12-12-24  AUTH# [id number]   [id number]/ALL   [employer] MEDICARE APPROVED 4 PT VISITS 12-16-24 THRU 1-13-25  AUTH# [id number]   [id number]/ALL     Visit # 10    Current Problem  1. Achilles tendon pain  Follow Up In Physical Therapy      2. Achilles tendinitis of right lower extremity  Follow Up In Physical Therapy          Subjective    General  Pt returns to PT today follow 4 week lapse in care.  States he has been doing \"okay\" & is improving.  \"I tend to baby it a little.\"  He states he has not been doing his ex's at home, but has signed up for a gym program & gone a couple of times so far.  He has only rode the bike so far at the gym, but is hoping to get more consistent with the gym program.  He does state he keeps busy with the work he needs to do caring for his 3 horses & 2 dogs.  No issues reported with these activities.     Precautions  Precautions  Precautions Comment: no new falls to report    Pain  Pain Assessment  Pain Assessment: 0-10  0-10 (Numeric) Pain Score: 2  Pain Type: Chronic pain  Pain Location: Ankle  Pain Orientation: Right, Posterior  Pain Descriptors: Tightness (\"Restricted\")  Pain Frequency: Intermittent  Clinical Progression: Gradually improving    Objective   Pt ambulates without assistive device for support    Treatments:  Therapeutic Exercises (50257): 41 Minutes, 3 Units    Activities:  Vinay 10'  Ankle 4-way BTB x20  Ankle alphabet x1  Bridges 3'' holds, x20  SLR flex 2x10  RADHA  LAQ 3# x20  RADHA  (no wgt this visit)  Staggered STS, no UE support, x20  HR/TR x20      Resume NV:  Rockerboard F/B x20, balance x1 min              M/L, balance  Airex tandem walking with UE support x3L  Airex Side stepping x3L  Airex slow marching  SLS, round blk foam, " 10''x10  Retro step ups, 4'' step, ascending R/L, x20 e       Assessment:   Resumed/reviewed activities pt had previously performed here in clinic & could be currently performing with HEP.  Pt exhibits fair recall of activities, but had good follow through to vc's provided.  He completes activities with some difficulty secondary to general weakness.  Also reports some increased discomfort through ankle/into calf with HR's, so removed 1/2 roll this visit.  Otherwise tolerated session well & able to complete activities given with normal muscle soreness, fatigue reported.  Anticipate he will be able to continue resuming/progressing with activities next visit to further address his strength & stability.      Plan:   Assess compliance with HEP at next visit.  Continue with POC & resume balance activities at next visit.  Progress activities as tolerated to further increase strength, stability & return to PLOF with daily activities.  Pt has 3 visits remaining on current POC (but only 1 more scheduled due to date range of approved visits).    OP EDUCATION:   Pt encouraged to resume, continue with HEP to increase overall strength, stability.

## 2025-01-13 ENCOUNTER — TREATMENT (OUTPATIENT)
Dept: PHYSICAL THERAPY | Facility: CLINIC | Age: 75
End: 2025-01-13
Payer: COMMERCIAL

## 2025-01-13 DIAGNOSIS — M76.60 ACHILLES TENDON PAIN: Primary | ICD-10-CM

## 2025-01-13 DIAGNOSIS — M76.61 ACHILLES TENDINITIS OF RIGHT LOWER EXTREMITY: ICD-10-CM

## 2025-01-13 PROCEDURE — 97110 THERAPEUTIC EXERCISES: CPT | Mod: GP,CQ

## 2025-01-13 PROCEDURE — 97112 NEUROMUSCULAR REEDUCATION: CPT | Mod: GP,CQ

## 2025-01-13 ASSESSMENT — PAIN SCALES - GENERAL: PAINLEVEL_OUTOF10: 1

## 2025-01-13 ASSESSMENT — PAIN DESCRIPTION - DESCRIPTORS: DESCRIPTORS: TIGHTNESS

## 2025-01-13 ASSESSMENT — PAIN - FUNCTIONAL ASSESSMENT: PAIN_FUNCTIONAL_ASSESSMENT: 0-10

## 2025-01-13 NOTE — PROGRESS NOTES
"Physical Therapy Treatment    Patient Name: Harsha Barber  MRN: [id number]  Today's Date: 1/13/2025       Insurance  [facility] MEDICARE APPROVED  10  PT VISITS 11-7-24 THRU 12-12-24  AUTH# [id number]   [id number]/ALL   [facility] MEDICARE APPROVED 4 PT VISITS 12-16-24 THRU 1-13-25  AUTH# [id number]   [id number]/ALL     Visit # 10    Current Problem  1. Achilles tendon pain            Subjective    General  Pt returns to PT today follow 4 week lapse in care.  States he has been doing \"okay\" & is improving.  \"I tend to baby it a little.\"  He states he has not been doing his ex's at home, but has signed up for a gym program & gone a couple of times so far.  He has only rode the bike so far at the gym, but is hoping to get more consistent with the gym program.  He does state he keeps busy with the work he needs to do caring for his 3 horses & 2 dogs.  No issues reported with these activities.     Precautions       Pain       Objective   Pt ambulates without assistive device for support    Treatments:  Therapeutic Exercises (05783): 41 Minutes, 3 Units    Activities:  Vinay 10'  Ankle 4-way BTB x20  Ankle alphabet x1  Bridges 3'' holds, x20  SLR flex 2x10  RADHA  LAQ 3# x20  RADHA  (no wgt this visit)  Staggered STS, no UE support, x20  HR/TR x20      Resume NV:  Rockerboard F/B x20, balance x1 min              M/L, balance  Airex tandem walking with UE support x3L  Airex Side stepping x3L  Airex slow marching  SLS, round blk foam, 10''x10  Retro step ups, 4'' step, ascending R/L, x20 e       Assessment:   Resumed/reviewed activities pt had previously performed here in clinic & could be currently performing with HEP.  Pt exhibits fair recall of activities, but had good follow through to vc's provided.  He completes activities with some difficulty secondary to general weakness.  Also reports some increased discomfort through ankle/into calf with HR's, so removed 1/2 roll this visit.  Otherwise tolerated session well & able to complete activities " given with normal muscle soreness, fatigue reported.  Anticipate he will be able to continue resuming/progressing with activities next visit to further address his strength & stability.      Plan:   Assess compliance with HEP at next visit.  Continue with POC & resume balance activities at next visit.  Progress activities as tolerated to further increase strength, stability & return to PLOF with daily activities.  Pt has 3 visits remaining on current POC (but only 1 more scheduled due to date range of approved visits).    OP EDUCATION:   Pt encouraged to resume, continue with HEP to increase overall strength, stability.

## 2025-01-13 NOTE — PROGRESS NOTES
"Physical Therapy Treatment    Patient Name: Harsha Barber  MRN: [id number]  Today's Date: 1/13/2025  Time Calculation  Start Time: 0920  Stop Time: 0947  Time Calculation (min): 27 min  PT Therapeutic Procedures Time Entry  Neuromuscular Re-Education Time Entry: 10  Therapeutic Exercise Time Entry: 15       Current Problem  1. Achilles tendon pain  Follow Up In Physical Therapy      2. Achilles tendinitis of right lower extremity  Follow Up In Physical Therapy          Subjective   General   Pt doing well overall. Some tightness, but mostly no issues.   Insurance   [employer] MEDICARE APPROVED  10  PT VISITS 11-7-24 THRU 12-12-24  AUTH# [id number]   [id number]/ALL   [employer] MEDICARE APPROVED 4 PT VISITS 12-16-24 THRU 1-13-25  AUTH# [id number]   [id number]/ALL      Visit # 11  Precautions  Precautions  Precautions Comment: no new falls to report  Pain  Pain Assessment: 0-10  0-10 (Numeric) Pain Score: 1  Pain Type: Chronic pain  Pain Location: Ankle  Pain Orientation: Right, Posterior  Pain Descriptors: Tightness    Objective   Treatments:  TM walking 1.5 mph 5 min  Step ups f/l 6in 2x10  Retro step ups 4in x20    Standing slow marching Airex pad x20  SLS 20\" x5 airex pad   Wobble board f/l x20    Airex Beam // Bars: 5 laps each  Tandem ambulation   Tandem ambulation retro  Sidestepping  Assessment   Cont ex's to progress LE strength as well as balance and coordination. Denies pain throughout treatment. Some LOB requiring UE to arrest, but showing good overall improvement in functional strength as well as dynamic balance. Pt for the most part able to complete activities at home without issue R ankle.  Plan:  Pt to be placed on 30-day hold    OP EDUCATION:       Goals:     "

## 2025-01-17 ENCOUNTER — APPOINTMENT (OUTPATIENT)
Dept: ORTHOPEDIC SURGERY | Facility: CLINIC | Age: 75
End: 2025-01-17
Payer: MEDICARE

## 2025-01-19 DIAGNOSIS — I10 PRIMARY HYPERTENSION: ICD-10-CM

## 2025-01-20 RX ORDER — PINDOLOL 10 MG/1
10 TABLET ORAL DAILY
Qty: 90 TABLET | Refills: 3 | Status: SHIPPED | OUTPATIENT
Start: 2025-01-20

## 2025-01-22 ENCOUNTER — APPOINTMENT (OUTPATIENT)
Dept: PRIMARY CARE | Facility: CLINIC | Age: 75
End: 2025-01-22
Payer: MEDICARE

## 2025-01-24 ENCOUNTER — APPOINTMENT (OUTPATIENT)
Dept: ORTHOPEDIC SURGERY | Facility: CLINIC | Age: 75
End: 2025-01-24
Payer: MEDICARE

## 2025-01-24 DIAGNOSIS — S86.011A ACHILLES TENDON TEAR, RIGHT, INITIAL ENCOUNTER: ICD-10-CM

## 2025-01-24 ASSESSMENT — PAIN - FUNCTIONAL ASSESSMENT: PAIN_FUNCTIONAL_ASSESSMENT: 0-10

## 2025-01-24 ASSESSMENT — PAIN SCALES - GENERAL: PAINLEVEL_OUTOF10: 0 - NO PAIN

## 2025-01-24 NOTE — PROGRESS NOTES
Chief Complaint   Patient presents with    Right Ankle - Follow-up     RT ANKLE SUBACUTE ACHILLES TENDON TEAR FOLLOW-UP  C/O STIFFNESS AND TIGHTNESS, DENIES PAIN.           HPI  74-year-old male presents today for evaluation of his right ankle.  Patient sustained an injury at the end September resulting in a Achilles tendon tear.  He has been working with physical therapy very happy with the way he is feeling does occasionally have some tightness about his ankle however has returned to activities cleaning out his [place].  With no issues.  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    Past Medical History:   Diagnosis Date    Acute upper respiratory infection, unspecified 03/05/2021    Acute upper respiratory infection    Closed fracture of lateral malleolus 08/21/2008    Closed nondisplaced transverse fracture of shaft of right fibula 10/24/2016    Encounter for immunization 07/17/2019    Need for 23-polyvalent pneumococcal polysaccharide vaccine    Encounter for immunization 07/17/2019    Need for shingles vaccine    Encounter for immunization 07/17/2019    Need for Tdap vaccination    Other conditions influencing health status 03/12/2021    History of cough    Personal history of malignant melanoma of skin 01/21/2022    History of malignant melanoma of skin    Personal history of other endocrine, nutritional and metabolic disease 01/20/2021    History of vitamin D deficiency    Personal history of urinary (tract) infections 04/06/2021    History of urinary tract infection    Severe acute respiratory syndrome coronavirus 2 (SARS-CoV-2) detected 01/11/2024    Unspecified cataract 02/28/2019    Cataracts, bilateral    Vitamin D deficiency 01/11/2024       Past Surgical History:   Procedure Laterality Date    OTHER SURGICAL HISTORY  02/28/2019    Bunionectomy    OTHER SURGICAL HISTORY  02/28/2019    Knee arthroscopy    OTHER SURGICAL HISTORY  02/28/2019    Rotator cuff repair     OTHER SURGICAL HISTORY  02/28/2019    Cataract surgery    OTHER SURGICAL HISTORY  02/25/2022    Foot surgery    OTHER SURGICAL HISTORY  02/25/2022    Excision melanoma    OTHER SURGICAL HISTORY  04/06/2020    Colonoscopy    OTHER SURGICAL HISTORY  07/17/2019    Skin biopsy        Allergies   Allergen Reactions    Aspirin Shortness of breath and Swelling    Bee Venom Protein (Honey Bee) Anaphylaxis        Physical exam    General: Alert and oriented to place, person, and time.  No acute distress and breathing comfortably; pleasant and cooperative with the examination.  HEENT: Head is normocephalic and atraumatic.  Neck: Supple, no visible swelling.  Cardiovascular: Good perfusion to the affected extremity.  Lungs: No audible wheezing or labored breathing.  Abdomen: Nondistended  HEME/Lymph : No visible abnormalities bilateral lower extremity    Extremity:  Right Ankle:  Skin healthy and intact  Swelling and ecchymosis noted  No tenderness to palpation: medial/lateral malleoli, lisfranc joint  No appreciable defect about the Achilles    Passive ROM - dorsi/plantar flexion, inversion, eversion: within normal limits  With gentle gastroscopies there is plantarflexion of the appreciable defect on exam  Neurovascular exam normal distally  2+ dorsalis pedis pulse and good cap refill     Diagnostics:  Procedure:  Procedures    Assessment: 74-year-old male with a healing Achilles tendon tear      Treatment plan:  The natural history of the condition and its associated treatment alternatives including surgical and nonsurgical options were discussed with the patient at length.  Discussed that for recovery does take 1 year time.  Discussed activities to avoid as well as importance of using pain as a guide.  As he is progressing rather rapidly through conservative treatment modalities we will continue with home exercise program as he has graduated physical therapy.  He will follow-up as needed  Range of motion as tolerated tibias  as tolerated  Very happy that he proceeded with conservative treatment.  All of the patient's questions were answered.

## 2025-01-31 DIAGNOSIS — M19.90 ARTHRITIS: ICD-10-CM

## 2025-01-31 RX ORDER — ETODOLAC 400 MG/1
TABLET, FILM COATED ORAL
Qty: 90 TABLET | Refills: 0 | Status: SHIPPED | OUTPATIENT
Start: 2025-01-31

## 2025-03-26 ENCOUNTER — TELEPHONE (OUTPATIENT)
Dept: PRIMARY CARE | Facility: CLINIC | Age: 75
End: 2025-03-26
Payer: MEDICARE

## 2025-03-28 ENCOUNTER — OFFICE VISIT (OUTPATIENT)
Dept: PRIMARY CARE | Facility: CLINIC | Age: 75
End: 2025-03-28
Payer: MEDICARE

## 2025-03-28 VITALS
BODY MASS INDEX: 28.33 KG/M2 | TEMPERATURE: 98.3 F | DIASTOLIC BLOOD PRESSURE: 88 MMHG | HEART RATE: 72 BPM | HEIGHT: 69 IN | SYSTOLIC BLOOD PRESSURE: 144 MMHG | RESPIRATION RATE: 16 BRPM | OXYGEN SATURATION: 95 % | WEIGHT: 191.25 LBS

## 2025-03-28 DIAGNOSIS — K62.5 BRBPR (BRIGHT RED BLOOD PER RECTUM): Primary | ICD-10-CM

## 2025-03-28 PROCEDURE — 99213 OFFICE O/P EST LOW 20 MIN: CPT | Performed by: FAMILY MEDICINE

## 2025-03-28 PROCEDURE — 1036F TOBACCO NON-USER: CPT | Performed by: FAMILY MEDICINE

## 2025-03-28 PROCEDURE — 1123F ACP DISCUSS/DSCN MKR DOCD: CPT | Performed by: FAMILY MEDICINE

## 2025-03-28 PROCEDURE — 3079F DIAST BP 80-89 MM HG: CPT | Performed by: FAMILY MEDICINE

## 2025-03-28 PROCEDURE — 3077F SYST BP >= 140 MM HG: CPT | Performed by: FAMILY MEDICINE

## 2025-03-28 PROCEDURE — 3008F BODY MASS INDEX DOCD: CPT | Performed by: FAMILY MEDICINE

## 2025-03-28 PROCEDURE — 1159F MED LIST DOCD IN RCRD: CPT | Performed by: FAMILY MEDICINE

## 2025-03-28 RX ORDER — PSYLLIUM HUSK 3.4 G/5.8G
1 POWDER ORAL 2 TIMES DAILY
Qty: 1040 G | Refills: 11 | Status: SHIPPED | OUTPATIENT
Start: 2025-03-28

## 2025-03-28 RX ORDER — HYDROCORTISONE 25 MG/G
CREAM TOPICAL 4 TIMES DAILY PRN
Qty: 30 G | Refills: 0 | Status: SHIPPED | OUTPATIENT
Start: 2025-03-28 | End: 2026-03-28

## 2025-03-28 ASSESSMENT — ENCOUNTER SYMPTOMS
DYSPHORIC MOOD: 0
NAUSEA: 0
CHEST TIGHTNESS: 0
EYE REDNESS: 0
SPEECH DIFFICULTY: 0
LIGHT-HEADEDNESS: 0
CHOKING: 0
ACTIVITY CHANGE: 0
SEIZURES: 0
WHEEZING: 0
COUGH: 0
AGITATION: 0
EYE DISCHARGE: 0
NUMBNESS: 0
UNEXPECTED WEIGHT CHANGE: 0
EYE ITCHING: 0
DYSURIA: 0
TROUBLE SWALLOWING: 0
NECK STIFFNESS: 0
ARTHRALGIAS: 1
HEMATOCHEZIA: 1
BRUISES/BLEEDS EASILY: 0
FACIAL ASYMMETRY: 0
JOINT SWELLING: 0
BLOOD IN STOOL: 0
RHINORRHEA: 0
APPETITE CHANGE: 0
SORE THROAT: 0
ABDOMINAL DISTENTION: 0
VOMITING: 0
ADENOPATHY: 0
WOUND: 0
CONFUSION: 0
PHOTOPHOBIA: 0
EYE PAIN: 0
VOICE CHANGE: 0
BACK PAIN: 0
SINUS PRESSURE: 0
HYPERTENSION: 1
DIZZINESS: 0
FEVER: 0
FREQUENCY: 0
ABDOMINAL PAIN: 0
HALLUCINATIONS: 0
POLYDIPSIA: 0
CHILLS: 0
FLANK PAIN: 0
HEMATURIA: 0
WEAKNESS: 0
SLEEP DISTURBANCE: 0

## 2025-03-28 ASSESSMENT — PATIENT HEALTH QUESTIONNAIRE - PHQ9
SUM OF ALL RESPONSES TO PHQ9 QUESTIONS 1 AND 2: 0
2. FEELING DOWN, DEPRESSED OR HOPELESS: NOT AT ALL
1. LITTLE INTEREST OR PLEASURE IN DOING THINGS: NOT AT ALL

## 2025-03-28 NOTE — PROGRESS NOTES
Subjective   Patient ID: Harsha Barber is a 74 y.o. male who presents for Rectal Bleeding (Last Friday and this past Wednesday not from constipation or straining. Prior to these has had with constipation or straining. ).    Patient comes to the office today complaining of bright red bleeding per rectum has happened twice over the past week while having bowel movement.  He reports that there was a significant amount of bleeding to the point where he actually had to use a Kotex to prevent soiling his underwear.  He denies any nausea, vomiting, diarrhea, fever, chills, shortness of breath or chest pain he denies any lightheadedness palpitations tachycardia or pallor.    Rectal Bleeding  This is a new problem. The current episode started in the past 7 days. The problem occurs intermittently. The problem has been waxing and waning. Associated symptoms include arthralgias (unchanged). Pertinent negatives include no abdominal pain, chills, congestion, coughing, fever, joint swelling, nausea, numbness, rash, sore throat, vomiting or weakness. Exacerbated by: Straining with BM. Treatments tried: Baby wipes. The treatment provided mild relief.        Review of Systems   Constitutional:  Negative for activity change, appetite change, chills, fever and unexpected weight change.   HENT:  Negative for congestion, ear pain, hearing loss, nosebleeds, postnasal drip, rhinorrhea, sinus pressure, sneezing, sore throat, tinnitus, trouble swallowing and voice change.    Eyes:  Negative for photophobia, pain, discharge, redness, itching and visual disturbance.   Respiratory:  Negative for cough, choking, chest tightness and wheezing.    Cardiovascular:  Negative for leg swelling.   Gastrointestinal:  Positive for hematochezia. Negative for abdominal distention, abdominal pain, blood in stool, nausea and vomiting.   Endocrine: Negative for polydipsia and polyuria.   Genitourinary:  Negative for dysuria, flank pain, frequency, hematuria  "and urgency.   Musculoskeletal:  Positive for arthralgias (unchanged). Negative for back pain, joint swelling and neck stiffness.   Skin:  Negative for rash and wound.   Allergic/Immunologic: Negative for immunocompromised state.   Neurological:  Negative for dizziness, seizures, syncope, facial asymmetry, speech difficulty, weakness, light-headedness and numbness.   Hematological:  Negative for adenopathy. Does not bruise/bleed easily.   Psychiatric/Behavioral:  Negative for agitation, behavioral problems, confusion, dysphoric mood, hallucinations, self-injury, sleep disturbance and suicidal ideas.        Objective   /88   Pulse 72   Temp 36.8 °C (98.3 °F) (Temporal)   Resp 16   Ht 1.753 m (5' 9\")   Wt 86.8 kg (191 lb 4 oz)   SpO2 95%   BMI 28.24 kg/m²     Physical Exam  Constitutional:       General: He is not in acute distress.     Appearance: He is not ill-appearing or diaphoretic.   HENT:      Head: Normocephalic and atraumatic.      Right Ear: External ear normal.      Left Ear: External ear normal.      Nose: Nose normal. No rhinorrhea.   Eyes:      General: Lids are normal. No scleral icterus.        Right eye: No discharge.         Left eye: No discharge.      Conjunctiva/sclera: Conjunctivae normal.   Cardiovascular:      Rate and Rhythm: Normal rate and regular rhythm.      Pulses: Normal pulses.      Heart sounds: No murmur heard.  Pulmonary:      Effort: Pulmonary effort is normal. No respiratory distress.      Breath sounds: No decreased breath sounds, wheezing, rhonchi or rales.   Abdominal:      General: Bowel sounds are normal. There is no distension.      Palpations: Abdomen is soft. There is no mass.      Tenderness: There is no abdominal tenderness. There is no guarding or rebound.   Musculoskeletal:         General: No swelling or tenderness.      Cervical back: No rigidity or tenderness.      Right lower leg: No edema.      Left lower leg: No edema.      Comments: Diffuse arthritic " deformities noted   Lymphadenopathy:      Cervical: No cervical adenopathy.      Upper Body:      Right upper body: No supraclavicular adenopathy.      Left upper body: No supraclavicular adenopathy.   Skin:     General: Skin is warm and dry.      Coloration: Skin is not jaundiced or pale.      Findings: No erythema, lesion or rash.   Neurological:      General: No focal deficit present.      Mental Status: He is alert and oriented to person, place, and time.      Sensory: No sensory deficit.      Motor: No weakness or tremor.      Coordination: Coordination normal.      Gait: Gait normal.   Psychiatric:         Mood and Affect: Mood normal. Affect is not inappropriate.         Behavior: Behavior normal.         Assessment/Plan   Diagnoses and all orders for this visit:  BRBPR (bright red blood per rectum)  -     Colonoscopy Diagnostic; Future  -     psyllium husk, aspartame, (Metamucil Sugar-Free, aspart,) 3.4 gram/5.8 gram powder; Take 1 Scoop by mouth 2 times a day.  -     hydrocortisone (Anusol-HC) 2.5 % rectal cream; Insert into the rectum 4 times a day as needed for hemorrhoids (rectal discomfort). Apply to affected areas    Reviewed prior colonoscopy back in 2018 there was a recommendation for a 5-year follow-up.  Adenomatous polyp was noted at that time and removed.  Due to prior history of adenomatous polyp and rectal bleeding we will arrange for diagnostic colonoscopy rather than waiting for a screening colonoscopy.  However symptoms likely secondary to hemorrhoids.  Preventative care discussed i.e. increase dietary fiber, utilize baby wipes to prevent anal irritation and Anusol cream as needed for symptom relief.  Keep next regular scheduled follow-up appointment

## 2025-03-31 DIAGNOSIS — K21.9 GASTROESOPHAGEAL REFLUX DISEASE, UNSPECIFIED WHETHER ESOPHAGITIS PRESENT: ICD-10-CM

## 2025-03-31 DIAGNOSIS — I10 PRIMARY HYPERTENSION: ICD-10-CM

## 2025-04-01 DIAGNOSIS — E78.2 MIXED HYPERLIPIDEMIA: ICD-10-CM

## 2025-04-01 RX ORDER — OMEPRAZOLE 20 MG/1
CAPSULE, DELAYED RELEASE ORAL
Qty: 100 CAPSULE | Refills: 2 | Status: SHIPPED | OUTPATIENT
Start: 2025-04-01

## 2025-04-01 RX ORDER — AMLODIPINE BESYLATE 5 MG/1
5 TABLET ORAL DAILY
Qty: 100 TABLET | Refills: 2 | Status: SHIPPED | OUTPATIENT
Start: 2025-04-01

## 2025-04-01 RX ORDER — EZETIMIBE 10 MG/1
10 TABLET ORAL NIGHTLY
Qty: 100 TABLET | Refills: 2 | Status: SHIPPED | OUTPATIENT
Start: 2025-04-01

## 2025-04-16 ENCOUNTER — CLINICAL SUPPORT (OUTPATIENT)
Dept: PREADMISSION TESTING | Facility: HOSPITAL | Age: 75
End: 2025-04-16
Payer: MEDICARE

## 2025-04-16 VITALS — HEIGHT: 69 IN | WEIGHT: 186 LBS | BODY MASS INDEX: 27.55 KG/M2

## 2025-04-16 NOTE — PREPROCEDURE INSTRUCTIONS

## 2025-04-29 ENCOUNTER — HOSPITAL ENCOUNTER (OUTPATIENT)
Dept: GASTROENTEROLOGY | Facility: HOSPITAL | Age: 75
Discharge: HOME | End: 2025-04-29
Payer: MEDICARE

## 2025-04-29 ENCOUNTER — ANESTHESIA EVENT (OUTPATIENT)
Dept: GASTROENTEROLOGY | Facility: HOSPITAL | Age: 75
End: 2025-04-29
Payer: MEDICARE

## 2025-04-29 ENCOUNTER — ANESTHESIA (OUTPATIENT)
Dept: GASTROENTEROLOGY | Facility: HOSPITAL | Age: 75
End: 2025-04-29
Payer: MEDICARE

## 2025-04-29 VITALS
TEMPERATURE: 97.2 F | RESPIRATION RATE: 18 BRPM | SYSTOLIC BLOOD PRESSURE: 129 MMHG | BODY MASS INDEX: 26.97 KG/M2 | HEIGHT: 69 IN | DIASTOLIC BLOOD PRESSURE: 82 MMHG | HEART RATE: 74 BPM | OXYGEN SATURATION: 98 % | WEIGHT: 182.1 LBS

## 2025-04-29 DIAGNOSIS — K62.5 BRBPR (BRIGHT RED BLOOD PER RECTUM): ICD-10-CM

## 2025-04-29 PROCEDURE — 7100000010 HC PHASE TWO TIME - EACH INCREMENTAL 1 MINUTE

## 2025-04-29 PROCEDURE — 45378 DIAGNOSTIC COLONOSCOPY: CPT | Performed by: SURGERY

## 2025-04-29 PROCEDURE — 7100000009 HC PHASE TWO TIME - INITIAL BASE CHARGE

## 2025-04-29 PROCEDURE — 3700000002 HC GENERAL ANESTHESIA TIME - EACH INCREMENTAL 1 MINUTE

## 2025-04-29 PROCEDURE — 2500000004 HC RX 250 GENERAL PHARMACY W/ HCPCS (ALT 636 FOR OP/ED)

## 2025-04-29 PROCEDURE — 3700000001 HC GENERAL ANESTHESIA TIME - INITIAL BASE CHARGE

## 2025-04-29 RX ORDER — PROPOFOL 10 MG/ML
INJECTION, EMULSION INTRAVENOUS AS NEEDED
Status: DISCONTINUED | OUTPATIENT
Start: 2025-04-29 | End: 2025-04-29

## 2025-04-29 RX ORDER — LIDOCAINE HYDROCHLORIDE 20 MG/ML
INJECTION, SOLUTION INFILTRATION; PERINEURAL AS NEEDED
Status: DISCONTINUED | OUTPATIENT
Start: 2025-04-29 | End: 2025-04-29

## 2025-04-29 RX ADMIN — PROPOFOL 100 MG: 10 INJECTION, EMULSION INTRAVENOUS at 10:43

## 2025-04-29 RX ADMIN — SODIUM CHLORIDE, POTASSIUM CHLORIDE, SODIUM LACTATE AND CALCIUM CHLORIDE: 600; 310; 30; 20 INJECTION, SOLUTION INTRAVENOUS at 10:36

## 2025-04-29 RX ADMIN — PROPOFOL 30 MG: 10 INJECTION, EMULSION INTRAVENOUS at 10:54

## 2025-04-29 RX ADMIN — PROPOFOL 100 MG: 10 INJECTION, EMULSION INTRAVENOUS at 10:50

## 2025-04-29 RX ADMIN — PROPOFOL 40 MG: 10 INJECTION, EMULSION INTRAVENOUS at 10:44

## 2025-04-29 RX ADMIN — LIDOCAINE HYDROCHLORIDE 3 ML: 20 INJECTION, SOLUTION INFILTRATION; PERINEURAL at 10:43

## 2025-04-29 RX ADMIN — PROPOFOL 100 MCG/KG/MIN: 10 INJECTION, EMULSION INTRAVENOUS at 10:45

## 2025-04-29 SDOH — HEALTH STABILITY: MENTAL HEALTH: CURRENT SMOKER: 0

## 2025-04-29 ASSESSMENT — ENCOUNTER SYMPTOMS
ANAL BLEEDING: 1
BLOOD IN STOOL: 1

## 2025-04-29 ASSESSMENT — PAIN SCALES - GENERAL
PAINLEVEL_OUTOF10: 0 - NO PAIN

## 2025-04-29 ASSESSMENT — PAIN - FUNCTIONAL ASSESSMENT
PAIN_FUNCTIONAL_ASSESSMENT: 0-10

## 2025-04-29 ASSESSMENT — COLUMBIA-SUICIDE SEVERITY RATING SCALE - C-SSRS
1. IN THE PAST MONTH, HAVE YOU WISHED YOU WERE DEAD OR WISHED YOU COULD GO TO SLEEP AND NOT WAKE UP?: NO
2. HAVE YOU ACTUALLY HAD ANY THOUGHTS OF KILLING YOURSELF?: NO
6. HAVE YOU EVER DONE ANYTHING, STARTED TO DO ANYTHING, OR PREPARED TO DO ANYTHING TO END YOUR LIFE?: NO

## 2025-04-29 NOTE — Clinical Note
Huddle and Timeout completed together with team. Patient wristband and [employer] information verified.  Anesthesia safety check completed. Patient was oriented and participated.

## 2025-04-29 NOTE — ANESTHESIA PREPROCEDURE EVALUATION
Patient: Harsha Barber    Procedure Information       Date/Time: 04/29/25 1000    Scheduled providers: Akin LEUNG MD; Charlie Arana MD; Marquise Dietrich RN; Joaquina Kee    Procedure: COLONOSCOPY    Location: [facility]            Relevant Problems   Cardiac   (+) Coronary artery disease of native artery of native heart with stable angina pectoris   (+) HTN (hypertension)   (+) Hyperlipidemia      Neuro   (+) Carpal tunnel syndrome, bilateral      GI   (+) GERD (gastroesophageal reflux disease)      Endocrine   (+) Acquired hypothyroidism      Musculoskeletal   (+) Carpal tunnel syndrome, bilateral   (+) Primary osteoarthritis involving multiple joints       Clinical information reviewed:   Tobacco  Allergies  Meds  Problems  Med Hx  Surg Hx   Fam Hx          NPO Detail:  NPO/Void Status  Date of Last Liquid: 04/29/25  Time of Last Liquid: 0400  Date of Last Solid: 04/28/25  Time of Last Solid: 0800         Physical Exam    Airway  Mallampati: II  TM distance: >3 FB  Neck ROM: full  Mouth opening: 3 or more finger widths     Cardiovascular - normal exam   Dental    Pulmonary - normal exam   Abdominal - normal exam           Anesthesia Plan    History of general anesthesia?: yes  History of complications of general anesthesia?: no    ASA 3     MAC     The patient is not a current smoker.  Patient did not smoke on day of procedure.    intravenous induction   Anesthetic plan and risks discussed with patient.    Plan discussed with CRNA and attending.

## 2025-04-29 NOTE — ANESTHESIA POSTPROCEDURE EVALUATION
Patient: Harsha Barber    Procedure Summary       Date: 04/29/25 Room / Location: [facility]    Anesthesia Start: 1036 Anesthesia Stop:     Procedure: COLONOSCOPY Diagnosis: BRBPR (bright red blood per rectum)    Scheduled Providers: Akin LEUNG MD; Charlie Arana MD; Marquise Dietrich RN; Joaquina Kee Responsible Provider: Charlie Arana MD    Anesthesia Type: MAC ASA Status: 3            Anesthesia Type: MAC    Vitals Value Taken Time   /58 04/29/25 11:05   Temp - 04/29/25 11:05   Pulse 70 04/29/25 11:05   Resp 16 04/29/25 11:05   SpO2 97 04/29/25 11:05       Anesthesia Post Evaluation    Patient location during evaluation: PACU  Patient participation: complete - patient participated  Level of consciousness: awake  Pain management: adequate  Multimodal analgesia pain management approach  Airway patency: patent  Cardiovascular status: acceptable  Respiratory status: acceptable  Hydration status: acceptable  Postoperative Nausea and Vomiting: none        No notable events documented.

## 2025-04-29 NOTE — H&P
"History Of Present Illness  Harsha Barber is a 74 y.o. male presenting with rectal bleeding history of polyps no rectal bleeding with prep no abdominal pain no family history of colorectal cancer last colonoscopy 2018.     Past Medical History  Medical History[1]    Surgical History  Surgical History[2]     Social History  He reports that he has quit smoking. His smoking use included cigarettes. He has quit using smokeless tobacco. He reports that he does not currently use alcohol after a past usage of about 2.0 standard drinks of alcohol per week. He reports that he does not use drugs.    Family History  Family History[3]     Allergies  Aspirin and Bee venom protein (honey bee)    Review of Systems   Gastrointestinal:  Positive for anal bleeding and blood in stool.   All other systems reviewed and are negative.       Physical Exam     Last Recorded Vitals  Blood pressure 127/81, pulse 80, temperature 36 °C (96.8 °F), temperature source Temporal, resp. rate 18, height 1.753 m (5' 9\"), weight 82.6 kg (182 lb 1.6 oz), SpO2 96%.    Relevant Results        Physical Exam  Constitutional:       Appearance: Normal appearance.   HENT:      Head: Normocephalic and atraumatic.      Mouth/Throat:      Pharynx: Oropharynx is clear.   Eyes:      Pupils: Pupils are equal, round, and reactive to light.   Cardiovascular:      Rate and Rhythm: Normal rate and regular rhythm.   Pulmonary:      Effort: Pulmonary effort is normal.      Breath sounds: Normal breath sounds.   Abdominal:      General: Abdomen is flat. Bowel sounds are normal.      Palpations: Abdomen is soft.   Musculoskeletal:         General: Normal range of motion.      Cervical back: Normal range of motion.   Skin:     General: Skin is warm.   Neurological:      General: No focal deficit present.      Mental Status: She is alert. Mental status is at baseline.   Psychiatric:         Mood and Affect: Mood normal.        Assessment & Plan  BRBPR (bright red blood per " rectum)      Rectal bleeding history of polyps plan colonoscopy discussed in detail with the patient consent obtained questions answered    I spent  minutes in the professional and overall care of this patient.      Akin Soriano MD         [1]   Past Medical History:  Diagnosis Date    Acute upper respiratory infection, unspecified 03/05/2021    Acute upper respiratory infection    Arthritis     Back pain     Closed fracture of lateral malleolus 08/21/2008    Closed nondisplaced transverse fracture of shaft of right fibula 10/24/2016    Encounter for immunization 07/17/2019    Need for 23-polyvalent pneumococcal polysaccharide vaccine    Encounter for immunization 07/17/2019    Need for shingles vaccine    Encounter for immunization 07/17/2019    Need for Tdap vaccination    GERD (gastroesophageal reflux disease)     Hiatal hernia     Hyperlipidemia     Hypertension     Hypothyroidism     Melanoma (Multi)     Other conditions influencing health status 03/12/2021    History of cough    Personal history of malignant melanoma of skin 01/21/2022    History of malignant melanoma of skin    Personal history of other endocrine, nutritional and metabolic disease 01/20/2021    History of vitamin D deficiency    Personal history of urinary (tract) infections 04/06/2021    History of urinary tract infection    Severe acute respiratory syndrome coronavirus 2 (SARS-CoV-2) detected 01/11/2024    Sleep apnea     Unspecified cataract 02/28/2019    Cataracts, bilateral    Vitamin D deficiency 01/11/2024   [2]   Past Surgical History:  Procedure Laterality Date    BUNIONECTOMY Left 02/25/2022    hammer toe    CARDIAC CATHETERIZATION      CATARACT EXTRACTION      COLONOSCOPY      KNEE ARTHROSCOPY W/ DEBRIDEMENT Right 02/28/2019    ROTATOR CUFF REPAIR Bilateral     SKIN CANCER EXCISION  02/25/2022    Excision melanoma   [3]   Family History  Problem Relation Name Age of Onset    Heart disease Mother      Dementia Mother       Hyperlipidemia Father      Heart disease Father      Obesity Sister x1     Suicidality Brother x1     Prostate cancer Brother x1

## 2025-04-29 NOTE — DISCHARGE INSTRUCTIONS
Patient Instructions after an endoscopy or colonoscopy    Physician Phone List Provided      The anesthetics, sedatives or narcotics which were given to you today will be acting in your body for the next 24 hours, so you might feel a little sleepy or groggy.  This feeling should slowly wear off. Carefully read and follow the instructions.     You received sedation today:  - Do not drive or operate any machinery or power tools of any kind.   - No alcoholic beverages today, not even beer or wine.  - No over the counter medications that contain alcohol or that may cause drowsiness.  - Do not make any important decisions or sign any legal documents.    While it is common to experience mild to moderate abdominal distention, gas, or belching after your procedure, if any of these symptoms occur following discharge from the GI Lab or within one week of having your procedure, call the [facility] to be advised whether a visit to your nearest Urgent Care or Emergency Department is indicated.  Take this paper with you if you go.     - If you develop an allergic reaction to the medications that were given during your procedure such as difficulty breathing, rash, hives, severe nausea, vomiting or lightheadedness.- If you experience chest pain, shortness of breath, severe abdominal pain, fevers and chills.    -If you develop signs and symptoms of bleeding such as blood in your spit, if your stools turn black, tarry, or bloody        High Fiber Diet    About this topic  Dietary fiber helps many illnesses. It can help you if you cannot have a bowel movement or if you have loose stools. Fiber can also lower your risk of diabetes and heart disease. Fiber can help with weight loss by helping you feel aldrich after meals.  You can find fiber in fruits, vegetables, nuts and seeds, whole grains, and legumes. The fiber is the part of the plant food that your body cannot break down and absorb. It passes through your stomach,  small bowel, colon, and out your body.  There are two kinds of fiber: Insoluble and soluble fiber. Insoluble fiber helps you pass foods through your digestive system. Insoluble fiber can help you with hard stools. Soluble fiber draws water in and turns it into a gel-like form making digestion slow down. Both are important.    What will the results be?  A high fiber diet can help you with bowel problems like stools that are too hard or too loose. It can also help prevent hemorrhoids and other colon problems. A high fiber diet can also help control your weight and lower blood sugar and cholesterol levels.  What changes to diet are needed?  The amount of fiber you need is based on your age, gender, and health.  Try to get 20 to 35 grams of fiber in your diet each day. Most people in the [place] only eat 15 grams of fiber daily.  Drink at least 8 cups (1920 mL) of fluid each day.  When is this diet used?  Your doctor may talk with you about this diet if you have belly problems.  Who should use this diet?  Older children, young people, and adults can have this diet.  Who should not use this diet?  Some people should not use this diet. Check with your doctor if you have:  Diverticulitis  Active Crohn's disease  Ulcerative colitis  Bowel inflammation  Certain types of GI surgery  Talk to your child's doctor before starting your child on a high fiber diet.  What foods are good to eat?  To get the most from fiber in your diet, eat a wide variety of high fiber foods. Some examples are:  Vegetables like:  Spinach  Peas  Artichoke  Sweet potatoes with skin  Broccoli  Fruits like:  Raspberries  Blueberries  Blackberries  Apples with skin  Dried fruits  Grains like:  Oat bran  Barley  Whole wheat products  Wheat bran  Dried beans and nuts like:  Sunflower seeds  Almonds  Black beans  Chickpeas  What problems could happen?  Sudden increase of fiber intake can lead to gas, pain, fullness in your belly, and loose stools. Increase fiber  gradually while drinking plenty of fluids.  Do not eat too much fiber. Your body will not take in vitamins and minerals as well if you eat too much fiber.  When do I need to call the doctor?  Health problem is not better or you are feeling worse.  Helpful tips  Start slow as you add more fiber to your diet. This may help prevent gas or cramps.  Try to eat the same amount of fiber each day. Aim to get your fiber from nutritious foods. Supplements do not offer the same benefits as food.  Read food labels with care to learn how much fiber is in the food you are eating.  If possible, do not peel fruits or vegetables before you eat them. Eating the peel gives you more fiber.  Where can I learn more?  Eat Right  https://www.eatright.org/food/vitamins-and-supplements/types-of-vitamins-and-nutrients/easy-ways-to-boost-fiber-in-your-daily-diet  Last Reviewed Date  2021-09-23   Libtayo Pregnancy And Lactation Text: This medication is contraindicated in pregnancy and when breast feeding.

## 2025-04-29 NOTE — Clinical Note
Patient tolerated procedure well. Appears comfortable with no complaints of pain. VS stable. Arousable prior to transport. Patient transported to [facility] via cart.  Report called per CRNA.  Handoff completed.

## 2025-05-24 DIAGNOSIS — E78.2 MIXED HYPERLIPIDEMIA: ICD-10-CM

## 2025-05-28 ENCOUNTER — APPOINTMENT (OUTPATIENT)
Dept: CARDIOLOGY | Facility: CLINIC | Age: 75
End: 2025-05-28
Payer: MEDICARE

## 2025-05-28 VITALS
BODY MASS INDEX: 27.96 KG/M2 | HEIGHT: 69 IN | SYSTOLIC BLOOD PRESSURE: 112 MMHG | HEART RATE: 72 BPM | DIASTOLIC BLOOD PRESSURE: 70 MMHG | WEIGHT: 188.8 LBS

## 2025-05-28 DIAGNOSIS — I25.10 CORONARY ARTERY DISEASE INVOLVING NATIVE CORONARY ARTERY OF NATIVE HEART WITHOUT ANGINA PECTORIS: ICD-10-CM

## 2025-05-28 DIAGNOSIS — I10 PRIMARY HYPERTENSION: ICD-10-CM

## 2025-05-28 PROCEDURE — 3008F BODY MASS INDEX DOCD: CPT | Performed by: INTERNAL MEDICINE

## 2025-05-28 PROCEDURE — 3078F DIAST BP <80 MM HG: CPT | Performed by: INTERNAL MEDICINE

## 2025-05-28 PROCEDURE — 1036F TOBACCO NON-USER: CPT | Performed by: INTERNAL MEDICINE

## 2025-05-28 PROCEDURE — 3074F SYST BP LT 130 MM HG: CPT | Performed by: INTERNAL MEDICINE

## 2025-05-28 PROCEDURE — 99213 OFFICE O/P EST LOW 20 MIN: CPT | Performed by: INTERNAL MEDICINE

## 2025-05-28 PROCEDURE — 1159F MED LIST DOCD IN RCRD: CPT | Performed by: INTERNAL MEDICINE

## 2025-05-28 RX ORDER — ATORVASTATIN CALCIUM 80 MG/1
80 TABLET, FILM COATED ORAL NIGHTLY
Qty: 100 TABLET | Refills: 2 | Status: SHIPPED | OUTPATIENT
Start: 2025-05-28

## 2025-05-28 NOTE — PATIENT INSTRUCTIONS
FOLLOW UP WITH Dr. Aden Sequeira MD, [facility] ON A PRN BASIS     DID YOU KNOW  We have a pharmacy here in the [facility].  They can fill all prescriptions, not just cardiac medications.  Prescriptions from other pharmacies can easily be transferred to the  pharmacy by the  pharmacist on site.   pharmacies offer FREE HOME DELIVERY on medications to anywhere in [place]. They can sync your medications. Typically prescriptions can be ready in 10 - 15 minutes. If pharmacy is unable to fill your  prescription or if cost is more than your paying now the Pharmacist can easily transfer back to your Pharmacy of choice. Pharmacy phone # [phone].     Please bring all medicines, vitamins, and herbal supplements with you in original bottles to every appointment! This is the best way to ensure your medication list in your chart is accurate.    Prescriptions will not be filled unless you are compliant with your follow up appointments or have a follow up appointment scheduled as per instruction of your physician. Refills should be requested at the time of your visit.

## 2025-05-28 NOTE — PROGRESS NOTES
Patient:  Harsha Barber  YOB: 1950  MRN: [id number]       Impression/Plan:     Diagnoses and all orders for this visit:  Primary hypertension  -    Pressures well-controlled renal function has been stable he has no adverse effects of current medication he will be following with Dr. Diehl on a regular basis for blood pressure monitoring  Coronary artery disease involving native coronary artery of native heart without angina pectoris  -     Quite mild on recent coronary CT angiogram  -     If blood pressure cholesterol remain well-controlled likelihood of progression is low    He will follow-up with Dr. Diehl on a regular basis return to this office on an as needed basis.  He is quite stable from a cardiovascular standpoint      Chief Complaint/Active Symptoms:      Chief Complaint   Patient presents with    Follow-up     Presents today for follow up of CAD and HTN       Harsha Barber is a 74 y.o. male who presents with  coronary disease.  Trivial coronary irregularities at coronary angiography 2006, he has hypertension, hyperlipidemia, severe aspirin allergy, sleep apnea, hypothyroidism and GERD.       Coronary CT angiogram 5/11/2024  IMPRESSION:  1.  Mild diffuse coronary artery disease without significant stenosis.  2.  Coronary calcium score 146, 44th percentile for age, gender race  in asymptomatic patients.     I had last seen him 5/28/2024 which time he was asymptomatic at a high functional capacity.  Known allergy to aspirin hence kept off aspirin has very minimal disease and tolerating statin Zetia well.  Blood pressure was well-controlled    January 2025 cholesterol 170 HDL 67 LDL 80, CMP normal, TSH normal, magnesium normal, CBC normal    He denies angina, dyspnea, palpitation, edema, lightheadedness or syncope.  He has had no symptoms of claudication or neurologic deterioration.  There have been no hospitalizations or emergency room visits since last office visit.    He has been doing  extraordinarily well of late.  He has had no cardiovascular symptoms whatsoever.  Sadly his wife passed away last year.  He cares for several other family members as well as dogs and horses at his home.  He is eating and drinking well.  He gets regular exercise caring for the animals.  No cardiovascular symptoms with this level of activity  Review of Systems: Unremarkable except as noted above    Meds     Current Outpatient Medications   Medication Instructions    amLODIPine (NORVASC) 5 mg, oral, Daily    atorvastatin (LIPITOR) 80 mg, oral, Nightly    cholecalciferol (Vitamin D-3) 50 mcg (2,000 unit) capsule 1 capsule, Daily    EPINEPHrine (EPIPEN) 0.3 mg, intramuscular, As needed    etodolac (Lodine) 400 mg tablet TAKE 1 TABLET BY MOUTH 2 TIMES A DAY AS NEEDED    ezetimibe (ZETIA) 10 mg, oral, Nightly    fluticasone (Flonase) 50 mcg/actuation nasal spray 2 sprays, Each Nostril, Daily, Shake gently. Before first use, prime pump. After use, clean tip and replace cap.    hydrocortisone (Anusol-HC) 2.5 % rectal cream rectal, 4 times daily PRN, Apply to affected areas    levothyroxine (SYNTHROID, LEVOXYL) 75 mcg, oral, Daily    nitroglycerin (NITROSTAT) 0.4 mg, sublingual, Every 5 min PRN, May repeat dose every 5 minutes for up to 3 doses total.    omeprazole (PriLOSEC) 20 mg DR capsule TAKE 1 CAPSULE BY MOUTH DAILY IN THE MORNING BEFORE A MEAL    pindolol (VISKEN) 10 mg, oral, Daily    psyllium husk, aspartame, (Metamucil Sugar-Free, aspart,) 3.4 gram/5.8 gram powder 1 Scoop, oral, 2 times daily    tiZANidine (ZANAFLEX) 4 mg, oral, 3 times daily PRN        Allergies   Allergies[1]      Annotated Problems     Specialty Problems          Cardiology Problems    Coronary artery disease of native artery of native heart with stable angina pectoris    Coronary CT angiogram 5/11/2024  IMPRESSION:  1.  Mild diffuse coronary artery disease without significant stenosis.  2.  Coronary calcium score 146, 44th percentile for age,  "gender race  in asymptomatic patients.    Also showed evidence of moderate hiatal hernia         HTN (hypertension)    Hyperlipidemia        Problem List     Patient Active Problem List    Diagnosis Date Noted    Achilles tendinitis of right lower extremity 01/08/2025    Malignant melanoma of trunk 07/21/2023    Hymenoptera allergy 07/21/2023    GERD (gastroesophageal reflux disease) 04/24/2023    HTN (hypertension) 04/24/2023    Hyperlipidemia 04/24/2023    Acquired hypothyroidism 04/24/2023    Coronary artery disease of native artery of native heart with stable angina pectoris 04/24/2023    Obstructive sleep apnea of adult 04/24/2023    Primary osteoarthritis involving multiple joints 04/24/2023    Carpal tunnel syndrome, bilateral 10/22/2020       Objective:     Vitals:    05/28/25 0801   BP: 112/70   BP Location: Left arm   Patient Position: Sitting   Pulse: 72   Weight: 85.6 kg (188 lb 12.8 oz)   Height: 1.753 m (5' 9\")      Wt Readings from Last 4 Encounters:   05/28/25 85.6 kg (188 lb 12.8 oz)   04/29/25 82.6 kg (182 lb 1.6 oz)   04/16/25 84.4 kg (186 lb)   03/28/25 86.8 kg (191 lb 4 oz)           LAB:     Lab Results   Component Value Date    WBC 4.8 01/02/2025    HGB 15.3 01/02/2025    HCT 46.6 01/02/2025     01/02/2025    CHOL 170 01/02/2025    TRIG 114 01/02/2025    HDL 67.3 01/02/2025    ALT 26 01/02/2025    AST 25 01/02/2025     01/02/2025    K 4.7 01/02/2025     01/02/2025    CREATININE 1.03 01/02/2025    BUN 16 01/02/2025    CO2 31 01/02/2025    TSH 3.36 01/02/2025    PSA 0.77 07/14/2022    HGBA1C 5.2 01/02/2025         Physical Exam     General Appearance: alert and oriented to person, place and time, in no acute distress  Cardiovascular: normal rate, regular rhythm, normal S1 and S2, no murmurs, rubs, clicks, or gallops,  no JVD  Pulmonary/Chest: clear to auscultation bilaterally- no wheezes, rales or rhonchi, normal air movement, no respiratory distress  Abdomen: soft, " non-tender, non-distended, normal bowel sounds, no masses   Extremities: no cyanosis, clubbing or edema  Skin: warm and dry, no rash or erythema  Eyes: EOMI  Neck: supple and non-tender without mass, no thyromegaly   Neurological: alert, oriented, normal speech, no focal findings or movement disorder noted  Vascular:     Scribe Attestation  By signing my name below, I, Lesli Recinos RN, Scribe   attest that this documentation has been prepared under the direction and in the presence of Aden Sequeira MD.        Provider attestation-scribe documentation  Any medical record entries made by the scribe were at my discretion and personally dictated by me.  I have reviewed the chart and agree that the record accurately reflects my personal performance of the history, physical exam, discussion and plan.                 [1]   Allergies  Allergen Reactions    Aspirin Shortness of breath and Swelling    Bee Venom Protein (Honey Bee) Anaphylaxis

## 2025-05-29 ENCOUNTER — APPOINTMENT (OUTPATIENT)
Dept: SURGERY | Facility: CLINIC | Age: 75
End: 2025-05-29
Payer: MEDICARE

## 2025-05-29 VITALS — BODY MASS INDEX: 27.91 KG/M2 | DIASTOLIC BLOOD PRESSURE: 86 MMHG | WEIGHT: 189 LBS | SYSTOLIC BLOOD PRESSURE: 150 MMHG

## 2025-05-29 DIAGNOSIS — K64.8 BLEEDING INTERNAL HEMORRHOIDS: Primary | ICD-10-CM

## 2025-05-29 PROCEDURE — 3079F DIAST BP 80-89 MM HG: CPT | Performed by: SURGERY

## 2025-05-29 PROCEDURE — 3077F SYST BP >= 140 MM HG: CPT | Performed by: SURGERY

## 2025-05-29 PROCEDURE — 1159F MED LIST DOCD IN RCRD: CPT | Performed by: SURGERY

## 2025-05-29 PROCEDURE — 1036F TOBACCO NON-USER: CPT | Performed by: SURGERY

## 2025-05-29 PROCEDURE — 99213 OFFICE O/P EST LOW 20 MIN: CPT | Performed by: SURGERY

## 2025-05-29 NOTE — PROGRESS NOTES
Subjective   Patient ID: Harsha Barber is a 74 y.o. male who presents for Post-op (colonoscopy).  HPI  Patient status post colonoscopy showed diverticulosis and internal hemorrhoids he has minimal hemorrhoidal symptoms had 3 episodes of bleeding now stopped he is mildly constipated but no further bleeding no abdominal pain  Review of Systems   All other systems reviewed and are negative.      Objective   Physical Exam  Physical Exam  Constitutional:       Appearance: Normal appearance.   HENT:      Head: Normocephalic and atraumatic.      Mouth/Throat:      Pharynx: Oropharynx is clear.   Eyes:      Pupils: Pupils are equal, round, and reactive to light.   Cardiovascular:      Rate and Rhythm: Normal rate and regular rhythm.   Pulmonary:      Effort: Pulmonary effort is normal.      Breath sounds: Normal breath sounds.   Abdominal:      General: Abdomen is flat. Bowel sounds are normal.      Palpations: Abdomen is soft.   Musculoskeletal:         General: Normal range of motion.      Cervical back: Normal range of motion.   Skin:     General: Skin is warm.   Neurological:      General: No focal deficit present.      Mental Status: She is alert. Mental status is at baseline.   Psychiatric:         Mood and Affect: Mood normal.     Assessment/Plan   Patient with internal hemorrhoids and diverticulosis minimally symptomatic from both discussed with the patient in detail follow-up for any hemorrhoidal symptoms or diverticular symptoms  Otherwise follow-up 5 to 6 years for colonoscopy patient agreeable         Akin Soriano MD 05/29/25 9:29 AM

## 2025-06-27 LAB
ALBUMIN SERPL-MCNC: 4.2 G/DL (ref 3.6–5.1)
ALP SERPL-CCNC: 72 U/L (ref 35–144)
ALT SERPL-CCNC: 28 U/L (ref 9–46)
ANION GAP SERPL CALCULATED.4IONS-SCNC: 9 MMOL/L (CALC) (ref 7–17)
AST SERPL-CCNC: 27 U/L (ref 10–35)
BASOPHILS # BLD AUTO: 92 CELLS/UL (ref 0–200)
BASOPHILS NFR BLD AUTO: 2.1 %
BILIRUB SERPL-MCNC: 1.2 MG/DL (ref 0.2–1.2)
BUN SERPL-MCNC: 13 MG/DL (ref 7–25)
CALCIUM SERPL-MCNC: 9.2 MG/DL (ref 8.6–10.3)
CHLORIDE SERPL-SCNC: 105 MMOL/L (ref 98–110)
CHOLEST SERPL-MCNC: 153 MG/DL
CHOLEST/HDLC SERPL: 2.4 (CALC)
CO2 SERPL-SCNC: 28 MMOL/L (ref 20–32)
CREAT SERPL-MCNC: 0.96 MG/DL (ref 0.7–1.28)
EGFRCR SERPLBLD CKD-EPI 2021: 83 ML/MIN/1.73M2
EOSINOPHIL # BLD AUTO: 211 CELLS/UL (ref 15–500)
EOSINOPHIL NFR BLD AUTO: 4.8 %
ERYTHROCYTE [DISTWIDTH] IN BLOOD BY AUTOMATED COUNT: 13 % (ref 11–15)
EST. AVERAGE GLUCOSE BLD GHB EST-MCNC: 108 MG/DL
EST. AVERAGE GLUCOSE BLD GHB EST-SCNC: 6 MMOL/L
GLUCOSE SERPL-MCNC: 98 MG/DL (ref 65–99)
HBA1C MFR BLD: 5.4 %
HCT VFR BLD AUTO: 44.8 % (ref 38.5–50)
HDLC SERPL-MCNC: 64 MG/DL
HGB BLD-MCNC: 15 G/DL (ref 13.2–17.1)
LDLC SERPL CALC-MCNC: 70 MG/DL (CALC)
LYMPHOCYTES # BLD AUTO: 748 CELLS/UL (ref 850–3900)
LYMPHOCYTES NFR BLD AUTO: 17 %
MAGNESIUM SERPL-MCNC: 2.2 MG/DL (ref 1.5–2.5)
MCH RBC QN AUTO: 30.5 PG (ref 27–33)
MCHC RBC AUTO-ENTMCNC: 33.5 G/DL (ref 32–36)
MCV RBC AUTO: 91.2 FL (ref 80–100)
MONOCYTES # BLD AUTO: 436 CELLS/UL (ref 200–950)
MONOCYTES NFR BLD AUTO: 9.9 %
NEUTROPHILS # BLD AUTO: 2913 CELLS/UL (ref 1500–7800)
NEUTROPHILS NFR BLD AUTO: 66.2 %
NONHDLC SERPL-MCNC: 89 MG/DL (CALC)
PLATELET # BLD AUTO: 288 THOUSAND/UL (ref 140–400)
PMV BLD REES-ECKER: 9.5 FL (ref 7.5–12.5)
POTASSIUM SERPL-SCNC: 4.9 MMOL/L (ref 3.5–5.3)
PROT SERPL-MCNC: 6.5 G/DL (ref 6.1–8.1)
RBC # BLD AUTO: 4.91 MILLION/UL (ref 4.2–5.8)
SODIUM SERPL-SCNC: 142 MMOL/L (ref 135–146)
TRIGL SERPL-MCNC: 107 MG/DL
TSH SERPL-ACNC: 2.61 MIU/L (ref 0.4–4.5)
WBC # BLD AUTO: 4.4 THOUSAND/UL (ref 3.8–10.8)

## 2025-07-07 ENCOUNTER — APPOINTMENT (OUTPATIENT)
Dept: PRIMARY CARE | Facility: CLINIC | Age: 75
End: 2025-07-07
Payer: MEDICARE

## 2025-07-07 VITALS
WEIGHT: 186 LBS | OXYGEN SATURATION: 96 % | TEMPERATURE: 97.7 F | HEIGHT: 69 IN | HEART RATE: 68 BPM | DIASTOLIC BLOOD PRESSURE: 69 MMHG | RESPIRATION RATE: 16 BRPM | SYSTOLIC BLOOD PRESSURE: 111 MMHG | BODY MASS INDEX: 27.55 KG/M2

## 2025-07-07 DIAGNOSIS — I10 PRIMARY HYPERTENSION: Primary | ICD-10-CM

## 2025-07-07 DIAGNOSIS — E03.9 ACQUIRED HYPOTHYROIDISM: ICD-10-CM

## 2025-07-07 DIAGNOSIS — E78.2 MIXED HYPERLIPIDEMIA: ICD-10-CM

## 2025-07-07 DIAGNOSIS — K64.9 HEMORRHOIDS, UNSPECIFIED HEMORRHOID TYPE: ICD-10-CM

## 2025-07-07 DIAGNOSIS — M15.0 PRIMARY OSTEOARTHRITIS INVOLVING MULTIPLE JOINTS: ICD-10-CM

## 2025-07-07 DIAGNOSIS — B35.1 ONYCHOMYCOSIS OF GREAT TOE: ICD-10-CM

## 2025-07-07 DIAGNOSIS — K21.9 GASTROESOPHAGEAL REFLUX DISEASE, UNSPECIFIED WHETHER ESOPHAGITIS PRESENT: ICD-10-CM

## 2025-07-07 PROCEDURE — 3008F BODY MASS INDEX DOCD: CPT | Performed by: FAMILY MEDICINE

## 2025-07-07 PROCEDURE — 3074F SYST BP LT 130 MM HG: CPT | Performed by: FAMILY MEDICINE

## 2025-07-07 PROCEDURE — 99214 OFFICE O/P EST MOD 30 MIN: CPT | Performed by: FAMILY MEDICINE

## 2025-07-07 PROCEDURE — 3078F DIAST BP <80 MM HG: CPT | Performed by: FAMILY MEDICINE

## 2025-07-07 PROCEDURE — 1159F MED LIST DOCD IN RCRD: CPT | Performed by: FAMILY MEDICINE

## 2025-07-07 PROCEDURE — 1036F TOBACCO NON-USER: CPT | Performed by: FAMILY MEDICINE

## 2025-07-07 RX ORDER — PSYLLIUM HUSK 3.4 G/5.8G
1 POWDER ORAL 2 TIMES DAILY
Qty: 1040 G | Refills: 11 | Status: CANCELLED | OUTPATIENT
Start: 2025-07-07

## 2025-07-07 RX ORDER — PSYLLIUM HUSK 3.4 G/5.8G
1 POWDER ORAL 2 TIMES DAILY
Qty: 1040 G | Refills: 11 | Status: SHIPPED | OUTPATIENT
Start: 2025-07-07

## 2025-07-07 RX ORDER — ATORVASTATIN CALCIUM 80 MG/1
80 TABLET, FILM COATED ORAL NIGHTLY
Qty: 90 TABLET | Refills: 3 | Status: SHIPPED | OUTPATIENT
Start: 2025-07-07 | End: 2026-07-07

## 2025-07-07 RX ORDER — EZETIMIBE 10 MG/1
10 TABLET ORAL NIGHTLY
Qty: 90 TABLET | Refills: 3 | Status: SHIPPED | OUTPATIENT
Start: 2025-07-07 | End: 2026-07-07

## 2025-07-07 RX ORDER — LEVOTHYROXINE SODIUM 75 UG/1
75 TABLET ORAL DAILY
Qty: 90 TABLET | Refills: 3 | Status: SHIPPED | OUTPATIENT
Start: 2025-07-07 | End: 2026-07-02

## 2025-07-07 RX ORDER — ETODOLAC 400 MG/1
TABLET, FILM COATED ORAL
Qty: 60 TABLET | Refills: 11 | Status: SHIPPED | OUTPATIENT
Start: 2025-07-07

## 2025-07-07 RX ORDER — PINDOLOL 10 MG/1
10 TABLET ORAL DAILY
Qty: 90 TABLET | Refills: 3 | Status: SHIPPED | OUTPATIENT
Start: 2025-07-07

## 2025-07-07 RX ORDER — TERBINAFINE HYDROCHLORIDE 250 MG/1
250 TABLET ORAL DAILY
Qty: 30 TABLET | Refills: 2 | Status: SHIPPED | OUTPATIENT
Start: 2025-07-07 | End: 2025-10-05

## 2025-07-07 RX ORDER — OMEPRAZOLE 20 MG/1
20 CAPSULE, DELAYED RELEASE ORAL
Qty: 90 CAPSULE | Refills: 3 | Status: SHIPPED | OUTPATIENT
Start: 2025-07-07 | End: 2026-07-07

## 2025-07-07 RX ORDER — AMLODIPINE BESYLATE 5 MG/1
5 TABLET ORAL DAILY
Qty: 90 TABLET | Refills: 3 | Status: SHIPPED | OUTPATIENT
Start: 2025-07-07 | End: 2026-07-07

## 2025-07-07 ASSESSMENT — ENCOUNTER SYMPTOMS
ACTIVITY CHANGE: 0
APPETITE CHANGE: 0
FLANK PAIN: 0
ARTHRALGIAS: 1
DYSPHORIC MOOD: 0
WOUND: 0
PALPITATIONS: 0
DIARRHEA: 0
HEMATURIA: 0
WEAKNESS: 0
CHILLS: 0
EYE DISCHARGE: 0
SORE THROAT: 0
VOMITING: 0
HALLUCINATIONS: 0
RHINORRHEA: 0
CONSTIPATION: 0
TROUBLE SWALLOWING: 0
BLOOD IN STOOL: 0
SLEEP DISTURBANCE: 0
ABDOMINAL PAIN: 0
JOINT SWELLING: 0
SHORTNESS OF BREATH: 0
BRUISES/BLEEDS EASILY: 0
DIAPHORESIS: 0
COUGH: 0
FACIAL ASYMMETRY: 0
EYE ITCHING: 0
FATIGUE: 0
FEVER: 0
UNEXPECTED WEIGHT CHANGE: 0
EYE PAIN: 0
EYE REDNESS: 0
NECK STIFFNESS: 0
DYSURIA: 0
NECK PAIN: 0
FREQUENCY: 0
HYPERTENSION: 1
VOICE CHANGE: 0
ADENOPATHY: 0
NAUSEA: 0
TREMORS: 0
AGITATION: 0
NERVOUS/ANXIOUS: 0
NUMBNESS: 0
CHOKING: 0
HEADACHES: 0
SINUS PRESSURE: 0
POLYDIPSIA: 0
SEIZURES: 0
MYALGIAS: 0
PHOTOPHOBIA: 0
LIGHT-HEADEDNESS: 0
BACK PAIN: 0
ABDOMINAL DISTENTION: 0
CONFUSION: 0
DIZZINESS: 0
WHEEZING: 0
CHEST TIGHTNESS: 0
SPEECH DIFFICULTY: 0

## 2025-07-07 ASSESSMENT — PATIENT HEALTH QUESTIONNAIRE - PHQ9
1. LITTLE INTEREST OR PLEASURE IN DOING THINGS: NOT AT ALL
2. FEELING DOWN, DEPRESSED OR HOPELESS: NOT AT ALL
SUM OF ALL RESPONSES TO PHQ9 QUESTIONS 1 AND 2: 0

## 2025-07-07 NOTE — PROGRESS NOTES
Subjective   Patient ID: Harsha Barber is a 74 y.o. male who presents for 6 month check up (And review labs ).    Hypertension  This is a chronic problem. The current episode started more than 1 year ago. The problem is unchanged. The problem is controlled. Pertinent negatives include no chest pain, headaches, neck pain, palpitations or shortness of breath. Agents associated with hypertension include thyroid hormones and NSAIDs. Risk factors for coronary artery disease include dyslipidemia and male gender. Past treatments include beta blockers and calcium channel blockers. The current treatment provides significant improvement. There are no compliance problems.  Identifiable causes of hypertension include a hypertension causing med and a thyroid problem. There is no history of chronic renal disease.   Hyperlipidemia  This is a chronic problem. The current episode started more than 1 year ago. The problem is controlled. Recent lipid tests were reviewed and are normal. Exacerbating diseases include hypothyroidism. He has no history of chronic renal disease, diabetes, liver disease, obesity or nephrotic syndrome. Factors aggravating his hyperlipidemia include beta blockers. Pertinent negatives include no chest pain, myalgias or shortness of breath. Current antihyperlipidemic treatment includes statins and ezetimibe. The current treatment provides significant improvement of lipids. There are no compliance problems.  Risk factors for coronary artery disease include dyslipidemia, hypertension and male sex.   Thyroid Problem  Presents for follow-up visit. Patient reports no anxiety, cold intolerance, constipation, diaphoresis, diarrhea, fatigue, heat intolerance, palpitations or tremors. The symptoms have been stable. There is no history of diabetes.        Review of Systems   Constitutional:  Negative for activity change, appetite change, chills, diaphoresis, fatigue, fever and unexpected weight change.   HENT:  Negative  "for congestion, ear pain, hearing loss, nosebleeds, postnasal drip, rhinorrhea, sinus pressure, sneezing, sore throat, tinnitus, trouble swallowing and voice change.    Eyes:  Negative for photophobia, pain, discharge, redness, itching and visual disturbance.   Respiratory:  Negative for cough, choking, chest tightness, shortness of breath and wheezing.    Cardiovascular:  Negative for chest pain, palpitations and leg swelling.   Gastrointestinal:  Negative for abdominal distention, abdominal pain, blood in stool, constipation, diarrhea, nausea and vomiting.   Endocrine: Negative for cold intolerance, heat intolerance, polydipsia and polyuria.   Genitourinary:  Negative for dysuria, flank pain, frequency, hematuria and urgency.   Musculoskeletal:  Positive for arthralgias. Negative for back pain, joint swelling, myalgias, neck pain and neck stiffness.   Skin:  Negative for rash and wound.   Allergic/Immunologic: Negative for immunocompromised state.   Neurological:  Negative for dizziness, tremors, seizures, syncope, facial asymmetry, speech difficulty, weakness, light-headedness, numbness and headaches.   Hematological:  Negative for adenopathy. Does not bruise/bleed easily.   Psychiatric/Behavioral:  Negative for agitation, behavioral problems, confusion, dysphoric mood, hallucinations, self-injury, sleep disturbance and suicidal ideas. The patient is not nervous/anxious.        Objective   /69 (BP Location: Right arm, Patient Position: Sitting, BP Cuff Size: Large adult)   Pulse 68   Temp 36.5 °C (97.7 °F) (Temporal)   Resp 16   Ht 1.753 m (5' 9\")   Wt 84.4 kg (186 lb)   SpO2 96%   BMI 27.47 kg/m²     Physical Exam  Constitutional:       General: He is not in acute distress.     Appearance: He is not ill-appearing or diaphoretic.   HENT:      Head: Normocephalic and atraumatic.      Right Ear: External ear normal.      Left Ear: External ear normal.      Nose: Nose normal. No rhinorrhea.   Eyes:      " General: Lids are normal. No scleral icterus.        Right eye: No discharge.         Left eye: No discharge.      Conjunctiva/sclera: Conjunctivae normal.   Cardiovascular:      Rate and Rhythm: Normal rate and regular rhythm.      Pulses: Normal pulses.      Heart sounds: No murmur heard.  Pulmonary:      Effort: Pulmonary effort is normal. No respiratory distress.      Breath sounds: No decreased breath sounds, wheezing, rhonchi or rales.   Abdominal:      General: Bowel sounds are normal. There is no distension.      Palpations: Abdomen is soft. There is no mass.      Tenderness: There is no abdominal tenderness. There is no guarding or rebound.   Musculoskeletal:         General: No swelling or tenderness.      Cervical back: No rigidity or tenderness.      Right lower leg: No edema.      Left lower leg: No edema.      Comments: Diffuse arthritic deformities noted   Lymphadenopathy:      Cervical: No cervical adenopathy.      Upper Body:      Right upper body: No supraclavicular adenopathy.      Left upper body: No supraclavicular adenopathy.   Skin:     General: Skin is warm and dry.      Coloration: Skin is not jaundiced or pale.      Findings: No erythema, lesion or rash.   Neurological:      General: No focal deficit present.      Mental Status: He is alert and oriented to person, place, and time.      Sensory: No sensory deficit.      Motor: No weakness or tremor.      Coordination: Coordination normal.      Gait: Gait normal.   Psychiatric:         Mood and Affect: Mood normal. Affect is not inappropriate.         Behavior: Behavior normal.         Assessment/Plan   Diagnoses and all orders for this visit:  Primary hypertension  -     Follow Up In [facility] - PCP - Established  -     amLODIPine (Norvasc) 5 mg tablet; Take 1 tablet (5 mg) by mouth once daily.  -     pindolol (Visken) 10 mg tablet; Take 1 tablet (10 mg) by mouth once daily.  -     Follow Up In [facility] - PCP -  Medicare Annual; Future  -     Albumin-Creatinine Ratio, Urine Random; Future  -     CBC and Auto Differential; Future  -     Comprehensive Metabolic Panel; Future  -     Lipid Panel; Future  -     Magnesium; Future  -     TSH with reflex to Free T4 if abnormal; Future  Mixed hyperlipidemia  -     Follow Up In [facility]  -     atorvastatin (Lipitor) 80 mg tablet; Take 1 tablet (80 mg) by mouth once daily at bedtime.  -     ezetimibe (Zetia) 10 mg tablet; Take 1 tablet (10 mg) by mouth once daily at bedtime.  -     Follow Up In [facility] - PCP - Medicare Annual; Future  -     Comprehensive Metabolic Panel; Future  -     Lipid Panel; Future  -     TSH with reflex to Free T4 if abnormal; Future  Acquired hypothyroidism  -     Follow Up In [facility] Established  -     levothyroxine (Synthroid, Levoxyl) 75 mcg tablet; Take 1 tablet (75 mcg) by mouth once daily.  -     Follow Up In [facility] - PCP - Medicare Annual; Future  -     Comprehensive Metabolic Panel; Future  -     Lipid Panel; Future  -     TSH with reflex to Free T4 if abnormal; Future  Primary osteoarthritis involving multiple joints  -     Follow Up In [facility]  -     etodolac (Lodine) 400 mg tablet; TAKE 1 TABLET BY MOUTH 2 TIMES A DAY AS NEEDED  -     Follow Up In [facility] - PCP - Medicare Annual; Future  -     CBC and Auto Differential; Future  -     Comprehensive Metabolic Panel; Future  Gastroesophageal reflux disease, unspecified whether esophagitis present  -     omeprazole (PriLOSEC) 20 mg DR capsule; Take 1 capsule (20 mg) by mouth once daily in the morning. Take before meals. Do not crush or chew.  -     Follow Up In [facility] - PCP - Medicare Annual; Future  -     CBC and Auto Differential; Future  -     Comprehensive Metabolic Panel; Future  -     Magnesium; Future  Onychomycosis of great toe  -     terbinafine  (LamISIL) 250 mg tablet; Take 1 tablet (250 mg) by mouth once daily.  -     Aspartate Aminotransferase; Future  -     ALT; Future  Hemorrhoids, unspecified hemorrhoid type  -     psyllium husk (Metamucil Sugar-Free, aspart,) 3.4 gram/5.8 gram powder; Take 1 Scoop by mouth 2 times a day.     Patient was identified as a fall risk. Risk prevention instructions provided.

## 2025-08-07 DIAGNOSIS — B35.1 ONYCHOMYCOSIS OF GREAT TOE: ICD-10-CM

## 2025-08-14 LAB
ALT SERPL-CCNC: 26 U/L (ref 9–46)
AST SERPL-CCNC: 23 U/L (ref 10–35)

## 2026-01-07 ENCOUNTER — APPOINTMENT (OUTPATIENT)
Dept: PRIMARY CARE | Facility: CLINIC | Age: 76
End: 2026-01-07
Payer: MEDICARE